# Patient Record
Sex: FEMALE | Race: WHITE | NOT HISPANIC OR LATINO | Employment: OTHER | URBAN - METROPOLITAN AREA
[De-identification: names, ages, dates, MRNs, and addresses within clinical notes are randomized per-mention and may not be internally consistent; named-entity substitution may affect disease eponyms.]

---

## 2017-01-26 ENCOUNTER — ALLSCRIPTS OFFICE VISIT (OUTPATIENT)
Dept: OTHER | Facility: OTHER | Age: 52
End: 2017-01-26

## 2017-01-26 DIAGNOSIS — Z12.31 ENCOUNTER FOR SCREENING MAMMOGRAM FOR MALIGNANT NEOPLASM OF BREAST: ICD-10-CM

## 2017-01-29 LAB — CULTURE RESULT (HISTORICAL): NEGATIVE

## 2017-01-30 ENCOUNTER — GENERIC CONVERSION - ENCOUNTER (OUTPATIENT)
Dept: OTHER | Facility: OTHER | Age: 52
End: 2017-01-30

## 2017-02-16 ENCOUNTER — GENERIC CONVERSION - ENCOUNTER (OUTPATIENT)
Dept: FAMILY MEDICINE CLINIC | Facility: CLINIC | Age: 52
End: 2017-02-16

## 2017-05-23 ENCOUNTER — ALLSCRIPTS OFFICE VISIT (OUTPATIENT)
Dept: OTHER | Facility: OTHER | Age: 52
End: 2017-05-23

## 2017-05-23 ENCOUNTER — HOSPITAL ENCOUNTER (OUTPATIENT)
Dept: RADIOLOGY | Facility: CLINIC | Age: 52
Discharge: HOME | End: 2017-05-23
Payer: COMMERCIAL

## 2017-05-23 DIAGNOSIS — M79.641 PAIN OF RIGHT HAND: ICD-10-CM

## 2017-05-23 PROCEDURE — 73130 X-RAY EXAM OF HAND: CPT

## 2017-05-24 ENCOUNTER — GENERIC CONVERSION - ENCOUNTER (OUTPATIENT)
Dept: FAMILY MEDICINE CLINIC | Facility: CLINIC | Age: 52
End: 2017-05-24

## 2017-06-09 ENCOUNTER — HOSPITAL ENCOUNTER (OUTPATIENT)
Facility: HOSPITAL | Age: 52
Setting detail: OUTPATIENT SURGERY
Discharge: HOME | End: 2017-06-09
Attending: ORTHOPAEDIC SURGERY | Admitting: ORTHOPAEDIC SURGERY
Payer: COMMERCIAL

## 2017-06-09 VITALS
TEMPERATURE: 98 F | BODY MASS INDEX: 21.9 KG/M2 | DIASTOLIC BLOOD PRESSURE: 60 MMHG | RESPIRATION RATE: 16 BRPM | SYSTOLIC BLOOD PRESSURE: 127 MMHG | HEART RATE: 81 BPM | OXYGEN SATURATION: 99 % | HEIGHT: 70 IN | WEIGHT: 153 LBS

## 2017-06-09 RX ORDER — OXYCODONE HYDROCHLORIDE AND ACETAMINOPHEN 5; 325 MG/1; MG/1
1 TABLET ORAL EVERY 6 HOURS PRN
Qty: 15 TABLET | Refills: 0 | Status: SHIPPED | OUTPATIENT
Start: 2017-06-09 | End: 2017-06-19

## 2017-06-09 RX ORDER — LIDOCAINE HYDROCHLORIDE AND EPINEPHRINE 10; 10 MG/ML; UG/ML
INJECTION, SOLUTION INFILTRATION; PERINEURAL AS NEEDED
Status: DISCONTINUED | OUTPATIENT
Start: 2017-06-09 | End: 2017-06-09 | Stop reason: HOSPADM

## 2017-06-09 RX ORDER — IBUPROFEN 600 MG/1
600 TABLET ORAL EVERY 6 HOURS PRN
Qty: 30 TABLET | Refills: 0 | Status: SHIPPED | OUTPATIENT
Start: 2017-06-09 | End: 2018-03-09

## 2017-06-27 ENCOUNTER — ALLSCRIPTS OFFICE VISIT (OUTPATIENT)
Dept: OTHER | Facility: OTHER | Age: 52
End: 2017-06-27

## 2017-08-03 ENCOUNTER — ALLSCRIPTS OFFICE VISIT (OUTPATIENT)
Dept: OTHER | Facility: OTHER | Age: 52
End: 2017-08-03

## 2017-10-10 ENCOUNTER — GENERIC CONVERSION - ENCOUNTER (OUTPATIENT)
Dept: OTHER | Facility: OTHER | Age: 52
End: 2017-10-10

## 2017-11-07 ENCOUNTER — ALLSCRIPTS OFFICE VISIT (OUTPATIENT)
Dept: OTHER | Facility: OTHER | Age: 52
End: 2017-11-07

## 2017-11-08 NOTE — PROGRESS NOTES
Assessment  1  Asthma (493 90) (J45 909)   2  Type 1 diabetes mellitus without complication (793 70) (U80 0)   3  Chronic obstructive pulmonary disease (496) (J44 9)   4  Rheumatoid arthritis (714 0) (M06 9)    Plan  Asthma    · Azithromycin 250 MG Oral Tablet; TAKE AS DIRECTED PER PACKAGE  INSTRUCTIONS   · Nebulizer Supplies; Status:Complete;   Done: 68VEO6532    Discussion/Summary    In summary this is a 63-year-old woman with history of asthma in addition to type 1 diabetes, rheumatoid arthritis among other who presents today with acute set the patient asthma with bronchitis  Will give her azithromycin Z-Fareed and we also gave her a sample of Dulera 200, 1 puff b i d  she will continue to use her nebulizer with albuterol as well as her Singulair  She will push fluids and rest  She is asked to call if her condition is not improving over the next 3-4 days  She will call sooner or seek more urgent medical attention should deteriorate  She agrees  Chief Complaint  The last couple of weeks my asthma and allergies acted up  I broke out in a sweat and felt feverish  Daughter with pneumonia as well as grandson admitted with pneumonia and asthma exacerbation  Using nebs daily  History of Present Illness  HPI: The patient is a 63-year-old asthmatic type 1 diabetic female who states that over the past several weeks for her asthma has been worse as well as her allergies  Recently her daughter was diagnosis with pneumonia as well as her grandson was admitted over night with an asthma exacerbation on top of pneumonia  She has been using albuterol nebulizers daily  Recently she has developed fevers feeling that she has had no documented fever  She has no chest pain  She has no dyspnea edema lightheadedness etc  Blood sugars have been relatively labile  She has had some upper respiratory symptoms though no definitive  All nasal discharge postnasal drip or sputum cetera  Asthma (Follow-Up):  The patient is being seen for an acute exacerbation of asthma  The patient's long-term asthma pattern has been classified as mild persistent   The patient states she has been doing poorly with her asthma control since the last visit  Interval Symptoms:   worsened wheezing,-- worsened coughing,-- denies shortness of breath-- and-- worsened chest tightness  Associated symptoms include awakened at night because of cough, but-- no chest pain or discomfort  More frequent rescue inhaler use  Interval Triggers: upper respiratory tract infection-- and-- pollen exposure  Medications: a short acting beta agonist agent   Diabetes Type I (Follow-Up) (Brief): The patient is being seen for follow up of a routine clinic visit for diabetes mellitus type I  Home glucose testing results include a recent high glucose of mg/dl  Review of Systems    Constitutional: chills-- and-- feeling tired  Cardiovascular: no chest pain,-- no palpitations-- and-- no lower extremity edema  Respiratory: cough-- and-- wheezing, but-- no shortness of breath  Neurological: no dizziness  Active Problems  1  Acute gouty arthritis (274 01) (M10 9)   2  Acute pharyngitis, unspecified etiology (462) (J02 9)   3  Aftercare following surgery (V58 89) (Z48 89)   4  Allergic rhinitis (477 9) (J30 9)   5  Analgesic use (V58 69) (Z79 899)   6  Asthma (493 90) (J45 909)   7  Carpal tunnel syndrome (354 0) (G56 00)   8  Carpal tunnel syndrome, unspecified laterality (354 0) (G56 00)   9  Cervical disc herniation (722 0) (M50 20)   10  Cervical radiculopathy (723 4) (M54 12)   11  Cervical spinal stenosis (723 0) (M48 02)   12  Cervicalgia (723 1) (M54 2)   13  Chronic obstructive pulmonary disease (496) (J44 9)   14  De Quervain's tenosynovitis (727 04) (M65 4)   15  Degenerative disc disease, cervical (722 4) (M50 30)   16  Encounter for screening mammogram for breast cancer (V76 12) (Z12 31)   17  Follow-up examination following surgery (V67 00) (Z09)   18   Lumbago With Sciatica (724 3)   19  Muscle pain, myofacial (729 1) (M79 1)   20  Other acute sinusitis (461 8) (J01 80)   21  Postoperative infection (998 59) (T81 4XXA)   22  Pre-ulcerative corn or callous (700) (L84)   23  Rheumatoid arthritis (714 0) (M06 9)   24  Right hand pain (729 5) (M79 641)   25  Spondylosis of cervical region without myelopathy or radiculopathy (721 0) (M47 812)   26  Tenosynovitis of finger (727 05) (M65 9)   27  Tinea pedis (110 4) (B35 3)   28  Trigger finger of right thumb (727 03) (M65 311)   29  Trigger middle finger of left hand (727 03) (M65 332)   30  Type 1 diabetes mellitus without complication (984 18) (A82 5)   31  Ulnar neuropathy (354 2) (G56 20)   32  Ulnar neuropathy (354 2) (G56 20)   33  Ulnar neuropathy at elbow of right upper extremity (354 2) (G56 21)   34  Ulnar neuropathy at elbow, left (354 2) (G56 22)    Past Medical History  1  History of Acute Bronchitis With Bronchospasm (466 0)   2  History of Arm pain (729 5) (M79 603)   3  History of Arm weakness (729 89) (R29 898)   4  History of Bilateral swelling of feet (729 81) (M79 89)   5  History of Cervical radiculopathy (723 4) (M54 12)   6  History of Cervical spinal stenosis (723 0) (M48 02)   7  History of Cervicalgia (723 1) (M54 2)   8  History of Chronic cough (786 2) (R05)   9  History of Chronic fatigue (780 79) (R53 82)   10  History of Dry skin (701 1) (L85 3)   11  Excessive thirst (783 5) (R63 1)   12  History of acute sinusitis (V12 69) (Z87 09)   13  History of arthritis (V13 4) (Z87 39)   14  History of arthritis (V13 4) (Z87 39)   15  History of breast lump (V13 89) (Z87 898)   16  History of bronchitis (V12 69) (Z87 09)   17  History of depression (V11 8) (Z86 59)   18  History of diabetes mellitus (V12 29) (Z86 39)   19  History of esophageal reflux (V12 79) (Z87 19)   20  History of fibromyalgia (V13 59) (Z87 39)   21  History of gastric ulcer (V12 79) (Z87 19)   22   History of heartburn (V12 79) (H38 361) 23  History of hypertension (V12 59) (Z86 79)   24  History of hypothyroidism (V12 29) (Z86 39)   25  History of intermittent claudication (V12 50) (Z86 79)   26  History of pulmonary emphysema (V12 69) (Z87 09)   27  History of seasonal allergies (V15 09) (Z88 9)   28  History of shortness of breath (V13 89) (Z87 898)   29  History of sinusitis (V12 69) (Z87 09)   30  History of sleep disturbance (V13 89) (Z87 898)   31  History of thyroid disease (V12 29) (Z86 39)   32  History of type 1 diabetes mellitus (V12 29) (Z86 39)   33  History of Indigestion (536 8) (K30)   34  History of Joint pain (719 40) (M25 50)   35  History of Joint swelling (719 00) (M25 40)   36  History of Leg pain (729 5) (M79 606)   37  History of Nasal congestion (478 19) (R09 81)   38  History of Nasal drainage (478 19) (J34 89)   39  History of Night sweat (780 8) (R61)   40  History of Sinus pain (478 19) (J34 89)   41  History of Wears eyeglasses (V49 89) (Z97 3)    Family History  Mother    1  Family history of arthritis (V17 7) (Z82 61)   2  Family history of High cholesterol   3  Family history of Irregular heartbeat  Father    4  Family history of arthritis (V17 7) (Z82 61)   5  Family history of hypertension (V17 49) (Z82 49)   6  Family history of High cholesterol   7  Family history of Neck problem  Sibling    8  Family history of High cholesterol  Maternal Grandmother    9  Family history of diabetes mellitus (V18 0) (Z83 3)   10  Family history of hypertension (V17 49) (Z82 49)   11  Family history of High cholesterol  Paternal Grandmother    15  Family history of blood clots (V18 3) (Z82 49)  Maternal Grandfather    13  Family history of Automobile accident  Paternal Grandfather    15  Family history of Automobile accident  Family History    13  Family history of Back problem   16  Family history of Cancer   17  Family history of arthritis (V17 7) (Z82 61)   18  Family history of cardiac disorder (V17 49) (Z82 49)   19   Family history of diabetes mellitus (V18 0) (Z83 3)   20  Family history of Hypertension, benign   21  Family history of Neuropathy, generalized    Social History   · Denied: History of Alcohol   · Current every day smoker (305 1) (F17 200)   · Denied: History of Currently sexually active   · Drinks coffee   · Former smoker (V15 82) (K62 786)   · High school or GED   ·    · No drug use   · Two children   · Unemployed (V62 0) (Z56 0)    Surgical History  1  History of Back Surgery   2  History of Hysterectomy   3  History of Neuroplasty Ulnar Nerve    Current Meds   1  Albuterol Sulfate (2 5 MG/3ML) 0 083% Inhalation Nebulization Solution; USE 1 UNIT   DOSE IN NEBULIZER 4 TIMES DAILY; Therapy: 27TDC7961 to (Evaluate:18Jun2017)  Requested for: 26TCK1610; Last   Rx:39Pdq7224 Ordered   2  Atorvastatin Calcium 40 MG Oral Tablet; TAKE 1 TABLET DAILY; Therapy: 41FHM6585 to Recorded   3  Gabapentin 300 MG Oral Capsule; TAKE 1 CAPSULE 3 TIMES DAILY; Therapy: 92PYN6003 to (Evaluate:23May2017)  Requested for: 03Gmd5562; Last   Rx:23Bpo0964 Ordered   4  Jet-Air Reusable Nebulizer Sys KIT; USE AS DIRECTED; Therapy: 82NOV6287 to (Last Rx:08Apr2016) Ordered   5  Lancets Miscellaneous; Therapy: 62AEU0728 to (Last Rx:01Nov2011)  Requested for: 73EAJ7332 Ordered   6  Lancing Device Miscellaneous; Therapy: 73NEW4136 to (Last Rx:04Oct2011)  Requested for: 57DDI3566 Ordered   7  Levothyroxine Sodium 75 MCG Oral Tablet; TAKE 1 TABLET DAILY; Therapy: 98CFB0834 to Recorded   8  Montelukast Sodium 10 MG Oral Tablet; Take 1 tablet daily; Therapy: 08Apr2016 to (Evaluate:13Jan2018)  Requested for: 69CCE5712; Last   Rx:81Tzl2754 Ordered   9  Nebulizer Device; USE AS DIRECTED; Therapy: 06LGH4720 to (Last Rx:20Oct2012) Ordered   10  NovoLOG SOLN; INJECT 150 UNIT  EVERY THREE DAYS; Therapy: (Recorded:09Cqw9902) to Recorded   11  OneTouch Ultra Blue In [employer];     Therapy: 87WPO5881 to (Last Rx:10Sep2011)  Requested for: 25PIN2082 Ordered   12  OneTouch Ultra Control In Vitro Solution; Therapy: 93IRP8644 to (Last Rx:04Oct2011)  Requested for: 61AZV3351 Ordered   13  ProAir  (90 Base) MCG/ACT Inhalation Aerosol Solution; INHALE 2 PUFFS    FOUR TIMES DAILY AS DIRECTED; Therapy: 00QXP1691 to (Evaluate:49Fwm4132)  Requested for: 08Apr2016; Last    Rx:41Rfv3474 Ordered    Allergies  1  CeleBREX CAPS   2  OxyCODONE HCl TABS  3  No Known Environmental Allergies   4  No Known Food Allergies    Vitals   Recorded: 23SIC3418 04:27PM   Temperature 76 6 F   Systolic 409   Diastolic 84   Height 5 ft 9 5 in   Weight 154 lb    BMI Calculated 22 42   BSA Calculated 1 86     Physical Exam    Constitutional   General appearance: Abnormal  -- Alert and oriented no apparent distress but mildly ill-appearing  Ears, Nose, Mouth, and Throat   Otoscopic examination: Tympanic membranes translucent with normal light reflex  Canals patent without erythema  Oropharynx: Normal with no erythema, edema, exudate or lesions  -- ECHO some moist without lesion  Pulmonary   Auscultation of lungs: Abnormal  -- Scattered rhonchi with moderate expiratory wheezing  No crackles appreciated  Cardiovascular   Auscultation of heart: Normal rate and rhythm, normal S1 and S2, without murmurs  -- With a rate in the 70s  Examination of extremities for edema and/or varicosities: Normal  -- No edema  Lymphatic   Palpation of lymph nodes in neck: No lymphadenopathy  -- No JVD  Musculoskeletal   Digits and nails: Normal without clubbing or cyanosis  -- No clubbing or cyanosis and normal capillary refill     Psychiatric   Orientation to person, place, and time: Normal     Mood and affect: Normal          Signatures   Electronically signed by : ISA Rose ; Nov 7 2017 10:34PM EST                       (Author)

## 2017-11-20 ENCOUNTER — GENERIC CONVERSION - ENCOUNTER (OUTPATIENT)
Dept: OTHER | Facility: OTHER | Age: 52
End: 2017-11-20

## 2017-11-20 ENCOUNTER — HOSPITAL ENCOUNTER (OUTPATIENT)
Dept: RADIOLOGY | Facility: HOSPITAL | Age: 52
Discharge: HOME | End: 2017-11-20
Payer: COMMERCIAL

## 2017-11-20 ENCOUNTER — TRANSCRIBE ORDERS (OUTPATIENT)
Dept: ADMINISTRATIVE | Facility: HOSPITAL | Age: 52
End: 2017-11-20

## 2017-11-20 DIAGNOSIS — J45.909 UNCOMPLICATED ASTHMA: ICD-10-CM

## 2017-11-20 DIAGNOSIS — J18.9 PNEUMONIA: ICD-10-CM

## 2017-11-20 PROCEDURE — 71020 HB CHEST X-RAY 2VW FRONTAL&LATL: CPT

## 2017-11-21 ENCOUNTER — GENERIC CONVERSION - ENCOUNTER (OUTPATIENT)
Dept: OTHER | Facility: OTHER | Age: 52
End: 2017-11-21

## 2017-12-14 ENCOUNTER — GENERIC CONVERSION - ENCOUNTER (OUTPATIENT)
Dept: FAMILY MEDICINE CLINIC | Facility: CLINIC | Age: 52
End: 2017-12-14

## 2018-01-09 ENCOUNTER — ALLSCRIPTS OFFICE VISIT (OUTPATIENT)
Dept: OTHER | Facility: OTHER | Age: 53
End: 2018-01-09

## 2018-01-10 NOTE — PROGRESS NOTES
Assessment   1  Other acute sinusitis (461 8) (J01 80)   2  Type 1 diabetes mellitus without complication (890 59) (Y21 8)   3  Chronic obstructive pulmonary disease (496) (J44 9)   4  Rheumatoid arthritis (714 0) (M06 9)    Plan   Other acute sinusitis    · Cefuroxime Axetil 500 MG Oral Tablet; TAKE 1 TABLET EVERY 12 HOURS DAILY   · Promethazine-DM 6 25-15 MG/5ML Oral Syrup; TAKE 5 - 10 ML BY MOUTH EVERY    6 HOURS AS NEEDED    Discussion/Summary      In summary then the patient is a insulin-dependent diabetic who presents with several days of respiratory symptomatology  By examination and history it appears that she has ethmoidal sinusitis  We are going to treat her with Ceftin 500 b i d  for 10 days with 1 refill  Also gave her some promethazine DM for symptom relief  She will push fluids and rest  She is going to monitor blood sugars  She will call endocrinologist should these become uncontrolled  She will follow up with us as needed or seek more urgent medical attention as needed  She agrees  Chief Complaint   I have green and yellow nasal d/c  Low grade fever  No CP but mild SOB  Mild facial and dental pressure  No N/V/D  L otalgia  Tobacco use  BS are running 180s since sick  History of Present Illness   HPI: The patient is a 24-year-old insulin-dependent diabetic who states she started with respiratory symptoms 1 week ago  She currently has yellow-green nasal discharge and facial pressure  She also complains of dental pressure  She has had no chest pain but she has had some mild shortness of breath and increased wheezing  She has had no nausea vomiting diarrhea  She has left otalgia  She continues to smoke  Her blood sugars are running in the 180 range  Review of Systems        Constitutional: fever,-- chills-- and-- feeling tired  ENT: nasal discharge  Cardiovascular: no chest pain-- and-- no lower extremity edema  Respiratory: PND        Gastrointestinal: no abdominal pain-- and-- no vomiting  Active Problems   1  Acute gouty arthritis (274 01) (M10 9)   2  Aftercare following surgery (V58 89) (Z48 89)   3  Allergic rhinitis (477 9) (J30 9)   4  Analgesic use (V58 69) (Z79 899)   5  Asthma (493 90) (J45 909)   6  Carpal tunnel syndrome (354 0) (G56 00)   7  Cervical disc herniation (722 0) (M50 20)   8  Cervical radiculopathy (723 4) (M54 12)   9  Cervical spinal stenosis (723 0) (M48 02)   10  Chronic obstructive pulmonary disease (496) (J44 9)   11  De Quervain's tenosynovitis (727 04) (M65 4)   12  Degenerative disc disease, cervical (722 4) (M50 30)   13  Lumbago With Sciatica (724 3)   14  Muscle pain, myofacial (729 1) (M79 1)   15  Other acute sinusitis (461 8) (J01 80)   16  History of Postoperative infection (998 59) (T81 4XXA)   17  Pre-ulcerative corn or callous (700) (L84)   18  Rheumatoid arthritis (714 0) (M06 9)   19  Spondylosis of cervical region without myelopathy or radiculopathy (721 0) (M47 812)   20  Trigger finger of right thumb (727 03) (M65 311)   21  Trigger middle finger of left hand (727 03) (M65 332)   22  Type 1 diabetes mellitus without complication (385 98) (P88 9)   23  Ulnar neuropathy at elbow of right upper extremity (354 2) (G56 21)   24  Ulnar neuropathy at elbow, left (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 2) (G56 22)    Past Medical History   1  History of Acute Bronchitis With Bronchospasm (466 0)   2  History of Arm pain (729 5) (M79 603)   3  History of Arm weakness (729 89) (R29 898)   4  History of Bilateral swelling of feet (729 81) (M79 89)   5  History of Cervical radiculopathy (723 4) (M54 12)   6  History of Cervical spinal stenosis (723 0) (M48 02)   7  History of Cervicalgia (723 1) (M54 2)   8  History of Chronic cough (786 2) (R05)   9  History of Chronic fatigue (780 79) (R53 82)   10  History of Dry skin (701 1) (L85 3)   11  Excessive thirst (783 5) (R63 1)   12  History of acute sinusitis (V12 69) (Z87 09)   13  History of arthritis (V13 4) (Z87 39)   14  History of arthritis (V13 4) (Z87 39)   15  History of breast lump (V13 89) (Z87 898)   16  History of bronchitis (V12 69) (Z87 09)   17  History of depression (V11 8) (Z86 59)   18  History of diabetes mellitus (V12 29) (Z86 39)   19  History of esophageal reflux (V12 79) (Z87 19)   20  History of fibromyalgia (V13 59) (Z87 39)   21  History of gastric ulcer (V12 79) (Z87 19)   22  History of heartburn (V12 79) (Z87 898)   23  History of hypertension (V12 59) (Z86 79)   24  History of hypothyroidism (V12 29) (Z86 39)   25  History of intermittent claudication (V12 50) (Z86 79)   26  History of pulmonary emphysema (V12 69) (Z87 09)   27  History of seasonal allergies (V15 09) (Z88 9)   28  History of shortness of breath (V13 89) (Z87 898)   29  History of sinusitis (V12 69) (Z87 09)   30  History of sleep disturbance (V13 89) (Z87 898)   31  History of thyroid disease (V12 29) (Z86 39)   32  History of type 1 diabetes mellitus (V12 29) (Z86 39)   33  History of Indigestion (536 8) (K30)   34  History of Joint pain (719 40) (M25 50)   35  History of Joint swelling (719 00) (M25 40)   36  History of Leg pain (729 5) (M79 606)   37  History of Nasal congestion (478 19) (R09 81)   38  History of Nasal drainage (478 19) (J34 89)   39  History of Night sweat (780 8) (R61)   40  History of Postoperative infection (998 59) (T81 4XXA)   41  History of Sinus pain (478 19) (J34 89)   42  History of Wears eyeglasses (V49 89) (Z97 3)    Family History   Mother    1  Family history of arthritis (V17 7) (Z82 61)   2  Family history of High cholesterol   3  Family history of Irregular heartbeat  Father    4  Family history of arthritis (V17 7) (Z82 61)   5  Family history of hypertension (V17 49) (Z82 49)   6  Family history of High cholesterol   7  Family history of Neck problem  Sibling    8  Family history of High cholesterol  Maternal Grandmother    9  Family history of diabetes mellitus (V18 0) (Z83 3)   10   Family history of hypertension (V17 49) (Z82 49)   11  Family history of High cholesterol  Paternal Grandmother    15  Family history of blood clots (V18 3) (Z82 49)  Maternal Grandfather    13  Family history of Automobile accident  Paternal Grandfather    15  Family history of Automobile accident  Family History    13  Family history of Back problem   16  Family history of Cancer   17  Family history of arthritis (V17 7) (Z82 61)   18  Family history of cardiac disorder (V17 49) (Z82 49)   19  Family history of diabetes mellitus (V18 0) (Z83 3)   20  Family history of Hypertension, benign   21  Family history of Neuropathy, generalized    Social History    · Denied: History of Alcohol   · Current every day smoker (305 1) (F17 200)   · Denied: History of Currently sexually active   · Drinks coffee   · Former smoker (V15 82) (J47 964)   · High school or GED   ·    · No drug use   · Two children   · Unemployed (V62 0) (Z56 0)  The social history was reviewed and updated today  The social history was reviewed and is unchanged  Surgical History   1  History of Back Surgery   2  History of Hysterectomy   3  History of Neuroplasty Ulnar Nerve    Current Meds    1  Albuterol Sulfate (2 5 MG/3ML) 0 083% Inhalation Nebulization Solution; USE 1 UNIT     DOSE IN NEBULIZER 4 TIMES DAILY; Therapy: 83EEN0065 to (Evaluate:18Jun2017)  Requested for: 82WQK1349; Last     Rx:66Ywe8746 Ordered   2  Atorvastatin Calcium 40 MG Oral Tablet; TAKE 1 TABLET DAILY; Therapy: 78BIR0394 to Recorded   3  Gabapentin 300 MG Oral Capsule; TAKE ONE CAPSULE BY MOUTH 3 TIMES A DAY; Therapy: 16KDF2623 to (Evaluate:31Mar2018)  Requested for: 62Zwi6833; Last     Rx:52Fue5378 Ordered   4  Gabapentin 600 MG Oral Tablet; TAKE 1 TABLET 3 times daily; Therapy: 61LKD7105 to (Evaluate:29Mar2018)  Requested for: 00Npl4370; Last     Rx:41Qeh3387 Ordered   5  Jet-Air Reusable Nebulizer Sys KIT; USE AS DIRECTED;      Therapy: 21UNR8215 to (Last Rx:08Apr2016) Ordered   6  Lancing Device Miscellaneous; Therapy: 01KLH2972 to (Last Rx:04Oct2011)  Requested for: 07AEI5959 Ordered   7  Levothyroxine Sodium 75 MCG Oral Tablet; TAKE 1 TABLET DAILY; Therapy: 34VQR5067 to Recorded   8  Montelukast Sodium 10 MG Oral Tablet; Take 1 tablet daily; Therapy: 35Ini5481 to (Evaluate:13Jan2018)  Requested for: 66KYV7867; Last     Rx:51Aaj7813 Ordered   9  Nebulizer Device; USE AS DIRECTED; Therapy: 67OEC2306 to (Last Rx:20Oct2012) Ordered   10  NovoLOG SOLN; INJECT 150 UNIT  EVERY THREE DAYS; Therapy: (Recorded:26Iyb8771) to Recorded   11  OneTouch Ultra Blue In [employer]; Therapy: 00NVQ2984 to (Last Rx:11Tdv0183)  Requested for: 05Xdj8268 Ordered   12  OneTouch Ultra Control In Vitro Solution; Therapy: 26NRO3439 to (Last Rx:04Oct2011)  Requested for: 81ZQK7722 Ordered   13  ProAir  (90 Base) MCG/ACT Inhalation Aerosol Solution; INHALE 2 PUFFS      FOUR TIMES DAILY AS DIRECTED; Therapy: 42SIE6460 to (Last Rx:20Nov2017)  Requested for: 20Nov2017 Ordered    Allergies   1  CeleBREX CAPS   2  OxyCODONE HCl TABS  3  No Known Environmental Allergies   4  No Known Food Allergies    Vitals    Recorded: 97RPU4811 04:06PM   Temperature 15 7 F   Systolic 656, LUE, Sitting   Diastolic 64, LUE, Sitting   Weight 156 lb    BMI Calculated 22 71   BSA Calculated 1 87     Physical Exam        Constitutional      General appearance: Abnormal   well developed,-- overweight-- and-- appearance reflects stated age  Eyes      Conjunctiva and lids: No swelling, erythema or discharge  Ears, Nose, Mouth, and Throat      External inspection of ears and nose: Abnormal  -- Tender ethmoidal       Otoscopic examination: Abnormal  -- BATOOL AS  Nasal mucosa, septum, and turbinates: Abnormal  -- Red and boggy  Oropharynx: Normal with no erythema, edema, exudate or lesions  -- No ulcer or exudate        Pulmonary      Respiratory effort: Abnormal  -- Occasional dry cough  Auscultation of lungs: Abnormal  -- Mild expiratory delay without crackle or rhonchi  Cardiovascular      Auscultation of heart: Normal rate and rhythm, normal S1 and S2, without murmurs  The heart rate was normal  The rhythm was regular  Heart sounds: normal S1,-- normal S2-- and-- no gallop heard  no murmurs were heard  Examination of extremities for edema and/or varicosities: Normal        Musculoskeletal      Digits and nails: Normal without clubbing or cyanosis  -- - CCE        Psychiatric      Orientation to person, place, and time: Normal        Mood and affect: Normal           Signatures    Electronically signed by : ISA Gibson ; Jan 9 2018  4:30PM EST                       (Author)

## 2018-01-11 NOTE — RESULT NOTES
Verified Results  ([facility]) Beta Strep Gp A Culture [id number] 12:00AM Sj Faustin     Test Name Result Flag Reference   Beta Strep Gp A Culture Negative         Discussion/Summary   Please let Citlali Tilley know that her throat culture was negative

## 2018-01-12 VITALS
BODY MASS INDEX: 22.19 KG/M2 | DIASTOLIC BLOOD PRESSURE: 70 MMHG | HEART RATE: 82 BPM | WEIGHT: 155 LBS | HEIGHT: 70 IN | SYSTOLIC BLOOD PRESSURE: 118 MMHG

## 2018-01-13 VITALS
DIASTOLIC BLOOD PRESSURE: 75 MMHG | WEIGHT: 158 LBS | BODY MASS INDEX: 22.62 KG/M2 | HEART RATE: 85 BPM | HEIGHT: 70 IN | SYSTOLIC BLOOD PRESSURE: 130 MMHG

## 2018-01-13 VITALS
SYSTOLIC BLOOD PRESSURE: 160 MMHG | WEIGHT: 154 LBS | TEMPERATURE: 98.2 F | BODY MASS INDEX: 22.05 KG/M2 | HEIGHT: 70 IN | DIASTOLIC BLOOD PRESSURE: 84 MMHG

## 2018-01-14 VITALS
DIASTOLIC BLOOD PRESSURE: 59 MMHG | HEIGHT: 70 IN | SYSTOLIC BLOOD PRESSURE: 121 MMHG | BODY MASS INDEX: 22.33 KG/M2 | WEIGHT: 156 LBS | TEMPERATURE: 97.9 F

## 2018-01-14 VITALS
HEIGHT: 70 IN | BODY MASS INDEX: 22.19 KG/M2 | HEART RATE: 80 BPM | DIASTOLIC BLOOD PRESSURE: 75 MMHG | WEIGHT: 155 LBS | SYSTOLIC BLOOD PRESSURE: 135 MMHG

## 2018-01-17 NOTE — RESULT NOTES
Verified Results  * XR CHEST PA & LATERAL 63UMH3888 05:57PM Richard Reed Order Number: [id number]     Test Name Result Flag Reference   XR CHEST PA & LATERAL (Report)     CHEST - DUAL ENERGY     INDICATION: Productive cough  Congestion, shortness of breath     COMPARISON: 8/20/2015     VIEWS: PA (including soft tissue/bone algorithms) and lateral projections     IMAGES: 4     FINDINGS:        Cardiomediastinal silhouette appears unremarkable  Lungs are hyperinflated suggesting COPD  No acute infiltrate  Stable biapical pleural-parenchymal scarring  No pneumothorax or pleural effusion  Visualized osseous structures appear within normal limits for the patient's age  IMPRESSION:     Findings suggest COPD   No acute infiltrate       Workstation performed: [id number]     Signed by:   Frankie Hanna MD   11/21/17

## 2018-01-22 VITALS
WEIGHT: 151 LBS | TEMPERATURE: 99.4 F | SYSTOLIC BLOOD PRESSURE: 120 MMHG | DIASTOLIC BLOOD PRESSURE: 60 MMHG | BODY MASS INDEX: 21.98 KG/M2

## 2018-01-23 VITALS
BODY MASS INDEX: 22.71 KG/M2 | DIASTOLIC BLOOD PRESSURE: 64 MMHG | SYSTOLIC BLOOD PRESSURE: 140 MMHG | TEMPERATURE: 99.1 F | WEIGHT: 156 LBS

## 2018-01-26 DIAGNOSIS — J45.909 PERSISTENT ASTHMA WITHOUT COMPLICATION, UNSPECIFIED ASTHMA SEVERITY: Primary | ICD-10-CM

## 2018-01-26 RX ORDER — MONTELUKAST SODIUM 10 MG/1
TABLET ORAL
Qty: 30 TABLET | Refills: 5 | Status: SHIPPED | OUTPATIENT
Start: 2018-01-26 | End: 2018-01-29 | Stop reason: SDUPTHER

## 2018-01-29 DIAGNOSIS — J45.909 PERSISTENT ASTHMA WITHOUT COMPLICATION, UNSPECIFIED ASTHMA SEVERITY: ICD-10-CM

## 2018-01-29 RX ORDER — MONTELUKAST SODIUM 10 MG/1
TABLET ORAL
Qty: 30 TABLET | Refills: 5 | Status: SHIPPED | OUTPATIENT
Start: 2018-01-29 | End: 2018-12-13 | Stop reason: SDUPTHER

## 2018-02-09 NOTE — RESULT NOTES
Verified Results  *VB-Foot Exam [id number] 12:00AM Jason Vidal     Test Name Result Flag Reference   FOOT EXAM [id number]
No

## 2018-03-09 ENCOUNTER — OFFICE VISIT (OUTPATIENT)
Dept: FAMILY MEDICINE CLINIC | Facility: CLINIC | Age: 53
End: 2018-03-09
Payer: COMMERCIAL

## 2018-03-09 VITALS
WEIGHT: 155 LBS | SYSTOLIC BLOOD PRESSURE: 120 MMHG | TEMPERATURE: 98.2 F | BODY MASS INDEX: 22.56 KG/M2 | DIASTOLIC BLOOD PRESSURE: 62 MMHG

## 2018-03-09 DIAGNOSIS — L02.219 ABSCESS OF PUBIC REGION: Primary | ICD-10-CM

## 2018-03-09 PROBLEM — M65.311 TRIGGER FINGER OF RIGHT THUMB: Status: ACTIVE | Noted: 2017-05-23

## 2018-03-09 PROCEDURE — 10060 I&D ABSCESS SIMPLE/SINGLE: CPT | Performed by: FAMILY MEDICINE

## 2018-03-09 PROCEDURE — 99213 OFFICE O/P EST LOW 20 MIN: CPT | Performed by: FAMILY MEDICINE

## 2018-03-09 RX ORDER — GABAPENTIN 600 MG/1
600 TABLET ORAL 3 TIMES DAILY
Refills: 2 | COMMUNITY
Start: 2018-02-08 | End: 2018-05-24 | Stop reason: SDUPTHER

## 2018-03-09 RX ORDER — DOXYCYCLINE HYCLATE 100 MG/1
100 CAPSULE ORAL EVERY 12 HOURS SCHEDULED
Qty: 20 CAPSULE | Refills: 0 | OUTPATIENT
Start: 2018-03-09 | End: 2018-03-19

## 2018-03-09 RX ORDER — LANCING DEVICE
EACH MISCELLANEOUS
COMMUNITY
Start: 2011-10-04 | End: 2020-07-06 | Stop reason: ALTCHOICE

## 2018-03-09 RX ORDER — LEVOTHYROXINE SODIUM 112 UG/1
112 TABLET ORAL DAILY
Refills: 0 | COMMUNITY
Start: 2018-02-17

## 2018-03-09 RX ORDER — ALBUTEROL SULFATE 2.5 MG/3ML
1 SOLUTION RESPIRATORY (INHALATION) 4 TIMES DAILY
COMMUNITY
Start: 2012-02-03 | End: 2018-12-06 | Stop reason: SDUPTHER

## 2018-03-09 RX ORDER — BLOOD-GLUCOSE CONTROL, NORMAL
EACH MISCELLANEOUS
COMMUNITY
Start: 2011-10-04

## 2018-03-09 NOTE — ASSESSMENT & PLAN NOTE
Patient has a abscess/carbuncle in her left upper supra pubic/para labial region  This was incised with a 11  Blade after sterile prep and purulent material was expressed for culture  Will await results of the culture and be in contact with patient when it is available  In the meantime will start her on doxycycline 100 mg b i d  for the potential for MRSA  She is asked to use warm compresses were soak in a warm soapy bath tub in the meantime  She will call if she develops fevers chills constitutional symptoms or seek more urgent medical attention  She all to watch her blood sugars as well due to this infection

## 2018-03-09 NOTE — PROGRESS NOTES
Assessment/Plan:  Abscess of pubic region  Patient has a abscess/carbuncle in her left upper supra pubic/para labial region  This was incised with a 11  Blade after sterile prep and purulent material was expressed for culture  Will await results of the culture and be in contact with patient when it is available  In the meantime will start her on doxycycline 100 mg b i d  for the potential for MRSA  She is asked to use warm compresses were soak in a warm soapy bath tub in the meantime  She will call if she develops fevers chills constitutional symptoms or seek more urgent medical attention  She all to watch her blood sugars as well due to this infection  Type 1 diabetes mellitus without complication (HCC)  Monitor blood sugars due to infection  Call or seek more urgent medical attention as needed  Diagnoses and all orders for this visit:    Abscess of pubic region  -     Culture, Aerobic Bacteria  -     doxycycline hyclate (VIBRAMYCIN) 100 mg capsule; Take 1 capsule (100 mg total) by mouth every 12 (twelve) hours for 10 days    Other orders  -     albuterol (2 5 mg/3 mL) 0 083 % nebulizer solution; Inhale 1 each 4 (four) times a day  -     PROAIR  (90 Base) MCG/ACT inhaler; Inhale 2 puffs 4 (four) times a day As directed  -     gabapentin (NEURONTIN) 600 MG tablet; 600 mg 3 (three) times a day  -     Respiratory Therapy Supplies (JET-AIR REUSABLE NEBULIZER SYS) KIT; by Does not apply route  -     Lancet Devices (LANCING DEVICE) MISC; by Does not apply route  -     levothyroxine 112 mcg tablet; Take 112 mcg by mouth daily  -     Nebulizers MISC; by Does not apply route  -     insulin aspart (NOVOLOG) 100 units/mL injection;  Inject under the skin  -     Discontinue: Glucose Blood (ONETOUCH ULTRA BLUE VI); by In Vitro route  -     Blood Glucose Calibration (OT ULTRA/FASTTK CNTRL SOLN) SOLN; by In Vitro route  -     ONE TOUCH ULTRA TEST test strip;           Subjective:   Chief Complaint Patient presents with    Recurrent Skin Infections     groin area xs a week ago     I noticed a boil in my groin area  Felt like I was bit by a spider  Drained 2 days later  Tried to squeeze and got worse  Yellow purulence then heme  No cyst previous  No fever, chills, etc  BSs are not elevated  History of same  Patient ID: Siddharth Mendoza is a 46 y o  female  HPI  Patient is a 63-year-old insulin-dependent diabetic female who states she noted a boil in her left pubic region approximately 9 days ago  She tried to squeeze and drainage 2 days later and got some whitish yellow fluid from a but after that seem to enlarge relatively rapidly  At times she gets some minimal heme tinged yellow fluid from it  It is tender red and very uncomfortable  She has had no fevers chills or constitutional symptoms  Her blood sugars have not been uncontrolled  She has had some history of folliculitis or cysts in the area but she has never sought medical attention for this in the past   The seemed to resolve on her own  Her son's family was recently noted to have MRSA  The following portions of the patient's history were reviewed and updated as appropriate: allergies, current medications, past family history, past medical history, past social history and problem list     Review of Systems   Constitution: Negative for chills, fever and malaise/fatigue  Cardiovascular: Negative for chest pain  Respiratory: Negative for shortness of breath  Endocrine: Negative for polydipsia and polyphagia  Hematologic/Lymphatic: Negative for adenopathy  Skin:        As noted in HPI she has swollen red indurated tender lesion in her left suprapubic region  Gastrointestinal: Negative for abdominal pain  Genitourinary: Negative for pelvic pain  Neurological: Negative for headaches and light-headedness  Objective:    Physical Exam   Constitutional: She is oriented to person, place, and time   She appears well-developed and well-nourished  Abdominal: Soft  Bowel sounds are normal    No inguinal adenopathy   Neurological: She is alert and oriented to person, place, and time  Skin: There is erythema  Her left superior janene labial region/lower inguinal region has a 3 cm tender indurated erythematous subcutaneous mass  There is superficial over approximately 2/3 of the dome of the lesion  There is a small area inferiorly which looks to have possibly previously drained  The area is prepped with Betadine  11   Blade is used to expand the area that appeared to have drained  Some whitish yellow purulence is expressed and culture obtained  Further drainage was not possible due to discomfort when expression was attempted  4 x 4 was applied for bandage

## 2018-03-13 DIAGNOSIS — A49.02 MRSA INFECTION: Primary | ICD-10-CM

## 2018-03-13 LAB
BACTERIA SPEC AEROBE CULT: ABNORMAL
Lab: ABNORMAL
SL AMB ANTIMICROBIAL SUSCEPTIBILITY: ABNORMAL

## 2018-03-13 NOTE — ASSESSMENT & PLAN NOTE
The patient's carbuncle cultured MRSA  Susceptible to doxycycline  She is going to finish same  We are going to add Bactroban to treat her nasal cavity  She is improving  She will call back with report in 1 week

## 2018-03-16 LAB
CREAT ?TM UR-SCNC: 0.66 UMOL/L
MICROALBUMIN UR-MCNC: 6.9 MG/L (ref 0–20)
MICROALBUMIN/CREAT UR: 6.9 MG/G{CREAT}

## 2018-03-16 PROCEDURE — 3061F NEG MICROALBUMINURIA REV: CPT | Performed by: FAMILY MEDICINE

## 2018-05-24 DIAGNOSIS — M54.2 CERVICALGIA: Primary | ICD-10-CM

## 2018-05-24 RX ORDER — GABAPENTIN 600 MG/1
TABLET ORAL
Qty: 90 TABLET | Refills: 0 | Status: SHIPPED | OUTPATIENT
Start: 2018-05-24 | End: 2018-06-26 | Stop reason: SDUPTHER

## 2018-06-26 DIAGNOSIS — M54.2 CERVICALGIA: ICD-10-CM

## 2018-06-26 RX ORDER — GABAPENTIN 600 MG/1
TABLET ORAL
Qty: 90 TABLET | Refills: 0 | Status: SHIPPED | OUTPATIENT
Start: 2018-06-26 | End: 2018-08-10 | Stop reason: SDUPTHER

## 2018-07-03 ENCOUNTER — OFFICE VISIT (OUTPATIENT)
Dept: URGENT CARE | Facility: CLINIC | Age: 53
End: 2018-07-03
Payer: COMMERCIAL

## 2018-07-03 VITALS
BODY MASS INDEX: 22.05 KG/M2 | OXYGEN SATURATION: 97 % | HEIGHT: 70 IN | TEMPERATURE: 98.2 F | WEIGHT: 154 LBS | DIASTOLIC BLOOD PRESSURE: 80 MMHG | HEART RATE: 92 BPM | SYSTOLIC BLOOD PRESSURE: 148 MMHG | RESPIRATION RATE: 18 BRPM

## 2018-07-03 DIAGNOSIS — J01.10 ACUTE FRONTAL SINUSITIS, RECURRENCE NOT SPECIFIED: Primary | ICD-10-CM

## 2018-07-03 PROCEDURE — 99214 OFFICE O/P EST MOD 30 MIN: CPT | Performed by: PHYSICIAN ASSISTANT

## 2018-07-03 RX ORDER — DEXTROMETHORPHAN HYDROBROMIDE AND PROMETHAZINE HYDROCHLORIDE 15; 6.25 MG/5ML; MG/5ML
5 SYRUP ORAL 4 TIMES DAILY PRN
Qty: 118 ML | Refills: 0 | Status: SHIPPED | OUTPATIENT
Start: 2018-07-03 | End: 2018-08-14 | Stop reason: ALTCHOICE

## 2018-07-03 RX ORDER — AMOXICILLIN AND CLAVULANATE POTASSIUM 875; 125 MG/1; MG/1
1 TABLET, FILM COATED ORAL EVERY 12 HOURS SCHEDULED
Qty: 14 TABLET | Refills: 0 | Status: SHIPPED | OUTPATIENT
Start: 2018-07-03 | End: 2018-07-10

## 2018-07-03 NOTE — PROGRESS NOTES
[facility]        NAME: Bhavik Wyatt is a 48 y o  female  : 1965    MRN: [id number]  DATE: July 3, 2018  TIME: 2:04 PM    Assessment and Plan   Acute frontal sinusitis, recurrence not specified [J01 10]  1  Acute frontal sinusitis, recurrence not specified  amoxicillin-clavulanate (AUGMENTIN) 875-125 mg per tablet    promethazine-dextromethorphan (PHENERGAN-DM) 6 25-15 mg/5 mL oral syrup         Patient Instructions       Follow up with PCP in 3-5 days  Proceed to  ER if symptoms worsen  Chief Complaint     Chief Complaint   Patient presents with    sinus pressure     x 1 month    Nasal Congestion     x 1 week +, took zyrtec    Sore Throat    Fever     on and off          History of Present Illness         24-year-old female with a longstanding history of sinus problems and seasonal allergies complains of 3-4 weeks of sinus pain and pressure in her forehead, productive cough  She uses a nebulizer and albuterol inhaler at home as needed  She is taking Zyrtec over-the-counter and using nasal saline  No fever at home  No chest pain or shortness of breath  Smoker  Review of Systems   Review of Systems      Current Medications       Current Outpatient Prescriptions:     albuterol (2 5 mg/3 mL) 0 083 % nebulizer solution, Inhale 1 each 4 (four) times a day, Disp: , Rfl:     atorvastatin (LIPITOR) 40 mg tablet, Take 80 mg by mouth daily  , Disp: , Rfl:     Blood Glucose Calibration (OT ULTRA/FASTTK CNTRL SOLN) SOLN, by In Vitro route, Disp: , Rfl:     cetirizine (ZyrTEC) 10 mg tablet, Take 10 mg by mouth daily  , Disp: , Rfl:     gabapentin (NEURONTIN) 600 MG tablet, TAKE 1 TABLET 3 TIMES A DAY   Lou Zamora PER MD PT NEEDS APPOINTMENT FOR ADDITIONAL REFILLS, Disp: 90 tablet, Rfl: 0    insulin aspart (NOVOLOG) 100 units/mL injection, Inject under the skin, Disp: , Rfl:     insulin lispro (HumaLOG) 100 units/mL injection, Inject under the skin 3 (three) times a day before meals Indications: pump , Disp: , Rfl:     Lancet Devices (LANCING DEVICE) MISC, by Does not apply route, Disp: , Rfl:     levothyroxine 112 mcg tablet, Take 112 mcg by mouth daily, Disp: , Rfl: 0    montelukast (SINGULAIR) 10 mg tablet, TAKE 1 TABLET DAILY, Disp: 30 tablet, Rfl: 5    mupirocin (BACTROBAN) 2 % ointment, Apply topically 3 (three) times a day Treat nasal cavity 3 times a day for 10-14 days with Q-tip , Disp: 22 g, Rfl: 0    Nebulizers MISC, by Does not apply route, Disp: , Rfl:     ONE TOUCH ULTRA TEST test strip, , Disp: , Rfl:     PROAIR  (90 Base) MCG/ACT inhaler, Inhale 2 puffs 4 (four) times a day As directed, Disp: , Rfl: 5    Respiratory Therapy Supplies (JET-AIR REUSABLE NEBULIZER SYS) KIT, by Does not apply route, Disp: , Rfl:     amoxicillin-clavulanate (AUGMENTIN) 875-125 mg per tablet, Take 1 tablet by mouth every 12 (twelve) hours for 7 days, Disp: 14 tablet, Rfl: 0    promethazine-dextromethorphan (PHENERGAN-DM) 6 25-15 mg/5 mL oral syrup, Take 5 mL by mouth 4 (four) times a day as needed for cough, Disp: 118 mL, Rfl: 0    Current Allergies     Allergies as of 07/03/2018 - Reviewed 07/03/2018   Allergen Reaction Noted    Celecoxib GI Bleeding 09/06/2012    Codeine GI Intolerance 08/15/2016    Motrin [ibuprofen] GI Intolerance 08/15/2016    Oxycodone  03/17/2016    Vicodin [hydrocodone-acetaminophen] GI Intolerance 08/15/2016            The following portions of the patient's history were reviewed and updated as appropriate: allergies, current medications, past family history, past medical history, past social history, past surgical history and problem list      Past Medical History:   Diagnosis Date    Asthma     Diabetes mellitus ([address] )     Disease of thyroid gland     Fibromyalgia syndrome     High cholesterol     Neuropathic pain of both legs     PONV (postoperative nausea and vomiting)        Past Surgical History:   Procedure Laterality Date    BACK SURGERY l5 - s1 fused    CARPAL TUNNEL RELEASE Left     HYSTERECTOMY      NE INCISE FINGER TENDON SHEATH Left 8/16/2016    Procedure: LONG TRIGGER FINGER RELEASE ;  Surgeon: Andrew Davila MD;  [facility];  Service: Orthopedics    NE INCISE FINGER TENDON SHEATH Right 6/9/2017    Procedure: THUMB TRIGGER RELEASE;  Surgeon: Andrew Davila MD;  [facility];  Service: Orthopedics    NE WRIST [employer] LIG Right 10/14/2016    Procedure: ENDOSCOPIC CARPAL TUNNEL RELEASE ;  Surgeon: Andrew Davila MD;  [facility];  Service: Orthopedics    NE WRIST [employer] LIG Left 8/16/2016    Procedure: ENDOSCOPIC CARPAL TUNNEL RELEASE ;  Surgeon: Andrew Davila MD;  [facility];  Service: Orthopedics    ULNAR NERVE TRANSPOSITION Right        Family History   Problem Relation Age of Onset    Hyperlipidemia Mother     Hypertension Mother     Heart disease Mother     Hyperlipidemia Father     Hypertension Father          Medications have been verified  Objective   /80 (BP Location: Left arm, Patient Position: Sitting, Cuff Size: Standard)   Pulse 92   Temp 98 2 °F (36 8 °C) (Tympanic)   Resp 18   Ht 5' 9 5" (1 765 m)   Wt 69 9 kg (154 lb)   SpO2 97%   BMI 22 42 kg/m²        Physical Exam     Physical Exam   Constitutional: She appears well-developed and well-nourished  No distress  HENT:   Right Ear: Tympanic membrane, external ear and ear canal normal    Left Ear: Tympanic membrane, external ear and ear canal normal    Nose: Mucosal edema and rhinorrhea present  Right sinus exhibits frontal sinus tenderness  Right sinus exhibits no maxillary sinus tenderness  Left sinus exhibits frontal sinus tenderness  Left sinus exhibits no maxillary sinus tenderness  Mouth/Throat: Oropharynx is clear and moist  No posterior oropharyngeal erythema  Eyes: Conjunctivae and EOM are normal  Pupils are equal, round, and reactive to light   No scleral icterus  Neck: Normal range of motion  Neck supple  Cardiovascular: Normal rate, regular rhythm and normal heart sounds  Pulmonary/Chest: Effort normal  No accessory muscle usage  No respiratory distress  She has no decreased breath sounds  She has wheezes  She has rhonchi  She has no rales  Abdominal: Soft  Bowel sounds are normal  She exhibits no distension and no mass  There is no tenderness  There is no rebound and no guarding  Lymphadenopathy:     She has no cervical adenopathy  Skin: Skin is warm and dry  No rash noted

## 2018-07-03 NOTE — PATIENT INSTRUCTIONS
Patient Instructions     Continue nebs and allergy medication  Follow up with PCP in 3-5 days  Proceed to  ER if symptoms worsen

## 2018-07-30 DIAGNOSIS — M54.2 CERVICALGIA: ICD-10-CM

## 2018-07-30 RX ORDER — GABAPENTIN 600 MG/1
TABLET ORAL
Qty: 90 TABLET | Refills: 0 | OUTPATIENT
Start: 2018-07-30

## 2018-08-04 LAB — HBA1C MFR BLD HPLC: 7.6 %

## 2018-08-10 DIAGNOSIS — M54.2 CERVICALGIA: ICD-10-CM

## 2018-08-10 RX ORDER — GABAPENTIN 600 MG/1
600 TABLET ORAL 3 TIMES DAILY
Qty: 90 TABLET | Refills: 0 | Status: SHIPPED | OUTPATIENT
Start: 2018-08-10 | End: 2018-09-13 | Stop reason: SDUPTHER

## 2018-08-14 ENCOUNTER — OFFICE VISIT (OUTPATIENT)
Dept: FAMILY MEDICINE CLINIC | Facility: CLINIC | Age: 53
End: 2018-08-14
Payer: COMMERCIAL

## 2018-08-14 VITALS
HEART RATE: 95 BPM | SYSTOLIC BLOOD PRESSURE: 118 MMHG | HEIGHT: 70 IN | WEIGHT: 149 LBS | OXYGEN SATURATION: 97 % | TEMPERATURE: 97.9 F | DIASTOLIC BLOOD PRESSURE: 60 MMHG | BODY MASS INDEX: 21.33 KG/M2

## 2018-08-14 DIAGNOSIS — E10.9 TYPE 1 DIABETES MELLITUS WITHOUT COMPLICATION (HCC): ICD-10-CM

## 2018-08-14 DIAGNOSIS — M54.16 LUMBAR RADICULITIS: Primary | ICD-10-CM

## 2018-08-14 PROCEDURE — 99214 OFFICE O/P EST MOD 30 MIN: CPT | Performed by: FAMILY MEDICINE

## 2018-08-14 PROCEDURE — 3008F BODY MASS INDEX DOCD: CPT | Performed by: FAMILY MEDICINE

## 2018-08-14 RX ORDER — ATORVASTATIN CALCIUM 80 MG/1
80 TABLET, FILM COATED ORAL DAILY
Refills: 3 | COMMUNITY
Start: 2018-08-08

## 2018-08-14 RX ORDER — HYDROCODONE BITARTRATE AND ACETAMINOPHEN 5; 325 MG/1; MG/1
1 TABLET ORAL EVERY 6 HOURS PRN
Qty: 30 TABLET | Refills: 0 | Status: SHIPPED | OUTPATIENT
Start: 2018-08-14 | End: 2018-12-13 | Stop reason: ALTCHOICE

## 2018-08-14 RX ORDER — PREDNISONE 10 MG/1
10 TABLET ORAL DAILY
Qty: 22 TABLET | Refills: 0 | Status: SHIPPED | OUTPATIENT
Start: 2018-08-14 | End: 2018-12-08

## 2018-08-14 NOTE — ASSESSMENT & PLAN NOTE
No results found for: HGBA1C    No results for input(s): POCGLU in the last 72 hours  Blood Sugar Average: Last 72 hrs:      She is going to need to be observant of the effect of the prednisone on her blood sugars

## 2018-08-14 NOTE — ASSESSMENT & PLAN NOTE
The patient has a history of lumbar degenerative disc disease and is status post diskectomy with fusion  She is having recurrent radicular symptoms  We are going to treat her with a prednisone taper and give her some hydrocodone to take at bedtime  She has now allergy listed the hydrocodone but is GI intolerance not a true allergy  She is going to be careful with that as we discussed  She is going to observe blood sugar control with prednisone  She is going to call her Endocrinology as in regards to her insulin pump  She is going to call Dr Sheridan for re-evaluation if she is not seeing significant improvement over the next few days  She is going to call or seek more urgent medical attention if she develops incontinence of bowel or bladder weakness of her lower extremities  She agrees

## 2018-08-14 NOTE — PROGRESS NOTES
Assessment/Plan:  Type 1 diabetes mellitus without complication (HCC)  No results found for: HGBA1C    No results for input(s): POCGLU in the last 72 hours  Blood Sugar Average: Last 72 hrs:      She is going to need to be observant of the effect of the prednisone on her blood sugars  Lumbar radiculitis    The patient has a history of lumbar degenerative disc disease and is status post diskectomy with fusion  She is having recurrent radicular symptoms  We are going to treat her with a prednisone taper and give her some hydrocodone to take at bedtime  She has now allergy listed the hydrocodone but is GI intolerance not a true allergy  She is going to be careful with that as we discussed  She is going to observe blood sugar control with prednisone  She is going to call her Endocrinology as in regards to her insulin pump  She is going to call Dr Sheridan for re-evaluation if she is not seeing significant improvement over the next few days  She is going to call or seek more urgent medical attention if she develops incontinence of bowel or bladder weakness of her lower extremities  She agrees  Diagnoses and all orders for this visit:    Lumbar radiculitis  -     predniSONE 10 mg tablet; Take 1 tablet (10 mg total) by mouth daily 4 daily for 2 days then 3 daily for 2 days then 2 daily for 2 days then 1 daily for 4 days  -     HYDROcodone-acetaminophen (NORCO) 5-325 mg per tablet; Take 1 tablet by mouth every 6 (six) hours as needed for pain Max Daily Amount: 4 tablets    Type 1 diabetes mellitus without complication (HCC)    Other orders  -     atorvastatin (LIPITOR) 80 mg tablet; Take 80 mg by mouth daily          Subjective:   Chief Complaint   Patient presents with    Back Pain     pt states she has low back pain that is radiating down her R leg  she does have numbness and tingling into her foot  she had not had her mammo or pap done  Patient ID: Nanyc Dobbins is a 48 y o  female  My back aches like a tooth ache  Radiates down to foot  Front, back and groin  Toes spasm at times  Has nocturnal pain which awakens her  I cant rest on that side  No incontinence  R leg feels dead  Neck bad as well giving her L sided occipital Has  Had previous lumbar fusion  L5-S1 2006  Dr Isaac Tan  Taking only 1 Tylenol  Insulin pump  HPI  The patient is a 27-year-old female who presents today for worsening right lower back pain with symptoms radicular to her right lower extremity  She has had some numbness and tingling in her foot  She also has pain which radiates into her right groin as well as right thigh and posterior gluteal area  She states that time the right leg feels dad  She has had some spasms in her toe and she has nocturnal pain which awakens her  She cannot rest on her right side  She does note that she has had no incontinence of bowel or bladder and she does not have any definitive weakness of her lower extremities  She is a type 1 diabetic on an insulin pump  She also has significant asthma by history  As well as hypothyroidism and COPD  She has cervical degenerative disc disease as well as lumbar by history  She is status post L5-S1 fusion  She also has a history of rheumatoid arthritis and fibromyalgia  She has had no recent trauma  She is maintained on insulin pump  The following portions of the patient's history were reviewed and updated as appropriate: allergies, current medications, past family history, past medical history, past social history, past surgical history and problem list     Review of Systems   Constitution: Negative for chills, decreased appetite, fever, malaise/fatigue, night sweats and weight loss  Cardiovascular: Negative  Respiratory: Negative  Skin: Negative for rash  Musculoskeletal: Positive for back pain, muscle cramps, myalgias, neck pain and stiffness  Negative for falls     Gastrointestinal: Negative for bowel incontinence and constipation  Genitourinary: Negative for bladder incontinence  Neurological: Positive for headaches  Negative for dizziness and focal weakness  Psychiatric/Behavioral: Negative for depression, substance abuse and suicidal ideas  The patient has insomnia  The patient is not nervous/anxious  Objective:    Physical Exam   Constitutional: She is oriented to person, place, and time  She appears well-developed and well-nourished  She appears distressed  Mild distress due to low back pain   Eyes: Conjunctivae are normal    Neck: Neck supple  No JVD present  No thyromegaly present  Cardiovascular: Normal rate, regular rhythm and normal heart sounds  Pulmonary/Chest: Effort normal and breath sounds normal  No respiratory distress  She has no wheezes  She has no rales  Musculoskeletal: She exhibits tenderness  She exhibits no edema or deformity  She has tenderness in the right para lumbar region  Tenderness of the right SI region and right gluteal region as well as a sign notch  She has  Surgical scar in the right paralumbar region   Lymphadenopathy:     She has no cervical adenopathy  Neurological: She is alert and oriented to person, place, and time  She displays abnormal reflex  She exhibits normal muscle tone  Coordination normal    She has diminished ankle jerks though there are fairly equal   Knee jerks are equal bilaterally and 2+   Skin: No rash noted     Psychiatric:   In pain

## 2018-09-13 DIAGNOSIS — J45.909 ASTHMA, UNSPECIFIED ASTHMA SEVERITY, UNSPECIFIED WHETHER COMPLICATED, UNSPECIFIED WHETHER PERSISTENT: Primary | ICD-10-CM

## 2018-09-13 DIAGNOSIS — M54.2 CERVICALGIA: ICD-10-CM

## 2018-09-13 RX ORDER — GABAPENTIN 600 MG/1
600 TABLET ORAL 3 TIMES DAILY
Qty: 90 TABLET | Refills: 0 | Status: SHIPPED | OUTPATIENT
Start: 2018-09-13 | End: 2018-10-08 | Stop reason: SDUPTHER

## 2018-10-08 DIAGNOSIS — M54.2 CERVICALGIA: ICD-10-CM

## 2018-10-08 RX ORDER — GABAPENTIN 600 MG/1
600 TABLET ORAL 3 TIMES DAILY
Qty: 90 TABLET | Refills: 0 | Status: SHIPPED | OUTPATIENT
Start: 2018-10-08 | End: 2018-10-29 | Stop reason: SDUPTHER

## 2018-10-29 DIAGNOSIS — M54.2 CERVICALGIA: ICD-10-CM

## 2018-10-29 DIAGNOSIS — J45.909 ASTHMA, UNSPECIFIED ASTHMA SEVERITY, UNSPECIFIED WHETHER COMPLICATED, UNSPECIFIED WHETHER PERSISTENT: ICD-10-CM

## 2018-10-29 RX ORDER — GABAPENTIN 600 MG/1
600 TABLET ORAL 3 TIMES DAILY
Qty: 90 TABLET | Refills: 0 | Status: SHIPPED | OUTPATIENT
Start: 2018-10-29 | End: 2019-01-07 | Stop reason: SDUPTHER

## 2018-11-12 LAB — HBA1C MFR BLD HPLC: 8.5 %

## 2018-12-06 DIAGNOSIS — J45.909 ASTHMA, UNSPECIFIED ASTHMA SEVERITY, UNSPECIFIED WHETHER COMPLICATED, UNSPECIFIED WHETHER PERSISTENT: Primary | ICD-10-CM

## 2018-12-06 RX ORDER — ALBUTEROL SULFATE 2.5 MG/3ML
2.5 SOLUTION RESPIRATORY (INHALATION) 4 TIMES DAILY
Qty: 25 VIAL | Refills: 0 | Status: SHIPPED | OUTPATIENT
Start: 2018-12-06 | End: 2018-12-13 | Stop reason: SDUPTHER

## 2018-12-08 ENCOUNTER — OFFICE VISIT (OUTPATIENT)
Dept: URGENT CARE | Facility: CLINIC | Age: 53
End: 2018-12-08
Payer: COMMERCIAL

## 2018-12-08 VITALS
OXYGEN SATURATION: 98 % | HEART RATE: 78 BPM | SYSTOLIC BLOOD PRESSURE: 154 MMHG | BODY MASS INDEX: 21.86 KG/M2 | WEIGHT: 147.6 LBS | RESPIRATION RATE: 18 BRPM | TEMPERATURE: 97.8 F | HEIGHT: 69 IN | DIASTOLIC BLOOD PRESSURE: 80 MMHG

## 2018-12-08 DIAGNOSIS — J20.9 ACUTE BRONCHITIS, UNSPECIFIED ORGANISM: Primary | ICD-10-CM

## 2018-12-08 PROCEDURE — 99213 OFFICE O/P EST LOW 20 MIN: CPT | Performed by: PHYSICIAN ASSISTANT

## 2018-12-08 RX ORDER — AMOXICILLIN AND CLAVULANATE POTASSIUM 875; 125 MG/1; MG/1
1 TABLET, FILM COATED ORAL EVERY 12 HOURS SCHEDULED
Qty: 20 TABLET | Refills: 0 | Status: SHIPPED | OUTPATIENT
Start: 2018-12-08 | End: 2018-12-18

## 2018-12-08 RX ORDER — BENZONATATE 100 MG/1
100 CAPSULE ORAL 3 TIMES DAILY PRN
Qty: 20 CAPSULE | Refills: 0 | Status: SHIPPED | OUTPATIENT
Start: 2018-12-08 | End: 2019-01-11

## 2018-12-08 NOTE — PATIENT INSTRUCTIONS
1  Bronchitis  -Take antibiotic as directed with food  -Take cough medicine as directed  -Use inhaler or nebulizer that you have at home every 4-6 hours as needed  -Tylenol/Motrin  -Increase fluids  -Follow-up with PCP within 3-5 days if no improvement    Go to ER with worsening symptoms, fever, chest pain, shortness of breath, or any signs of distress    Acute Bronchitis   AMBULATORY CARE:   Acute bronchitis  is swelling and irritation in the air passages of your lungs  This irritation may cause you to cough or have other breathing problems  Acute bronchitis often starts because of another illness, such as a cold or the flu  The illness spreads from your nose and throat to your windpipe and airways  Bronchitis is often called a chest cold  Acute bronchitis lasts about 3 to 6 weeks and is usually not a serious illness  Your cough can last for several weeks  You may have any of the following symptoms:   · A cough with sputum that may be clear, yellow, or green    · Feeling more tired than usual, and body aches    · A fever and chills    · Wheezing when you breathe    · A tight chest or pain when you breathe or cough  Seek care immediately if:   · You cough up blood  · Your lips or fingernails turn blue  · You feel like you are not getting enough air when you breathe  Contact your healthcare provider if:   · You have a fever  · Your breathing problems do not go away or get worse  · Your cough does not get better within 4 weeks  · You have questions or concerns about your condition or care  Self-care:   · Get more rest   Rest helps your body to heal  Slowly start to do more each day  Rest when you feel it is needed  · Avoid irritants in the air  Avoid chemicals, fumes, and dust  Wear a face mask if you must work around dust or fumes  Stay inside on days when air pollution levels are high  If you have allergies, stay inside when pollen counts are high   Do not use aerosol products, such as spray-on deodorant, bug spray, and hair spray  · Do not smoke or be around others who smoke  Nicotine and other chemicals in cigarettes and cigars damages the cilia that move mucus out of your lungs  Ask your healthcare provider for information if you currently smoke and need help to quit  E-cigarettes or smokeless tobacco still contain nicotine  Talk to your healthcare provider before you use these products  · Drink liquids as directed  Liquids help keep your air passages moist and help you cough up mucus  You may need to drink more liquids when you have acute bronchitis  Ask how much liquid to drink each day and which liquids are best for you  · Use a humidifier or vaporizer  Use a cool mist humidifier or a vaporizer to increase air moisture in your home  This may make it easier for you to breathe and help decrease your cough  Prevent acute bronchitis by doing the following:   · Get the vaccinations you need  Ask your healthcare provider if you should get vaccinated against the flu or pneumonia  · Prevent the spread of germs  You can decrease your risk of acute bronchitis and other illnesses by doing the following:     [facility] [place] your hands often with soap and water  Carry germ-killing hand lotion or gel with you  You can use the lotion or gel to clean your hands when soap and water are not available  ¨ Do not touch your eyes, nose, or mouth unless you have washed your hands first     ¨ Always cover your mouth when you cough to prevent the spread of germs  It is best to cough into a tissue or your shirt sleeve instead of into your hand  Ask those around you cover their mouths when they cough  ¨ Try to avoid people who have a cold or the flu  If you are sick, stay away from others as much as possible  Medicines: Your healthcare provider may  give you any of the following:  · Ibuprofen or acetaminophen  are medicines that help lower your fever  They are available without a doctor's order   Ask your healthcare provider which medicine is right for you  Ask how much to take and how often to take it  Follow directions  These medicines can cause stomach bleeding if not taken correctly  Ibuprofen can cause kidney damage  Do not take ibuprofen if you have kidney disease, an ulcer, or allergies to aspirin  Acetaminophen can cause liver damage  Do not take more than 4,000 milligrams in 24 hours  · Decongestants  help loosen mucus in your lungs and make it easier to cough up  This can help you breathe easier  · Cough suppressants  decrease your urge to cough  If your cough produces mucus, do not take a cough suppressant unless your healthcare provider tells you to  Your healthcare provider may suggest that you take a cough suppressant at night so you can rest     · Inhalers  may be given  Your healthcare provider may give you one or more inhalers to help you breathe easier and cough less  An inhaler gives your medicine to open your airways  Ask your healthcare provider to show you how to use your inhaler correctly  Follow up with your healthcare provider as directed:  Write down questions you have so you will remember to ask them during your follow-up visits  © 2017 [facility] Information is for End User's use only and may not be sold, redistributed or otherwise used for commercial purposes  All illustrations and images included in CareNotes® are the copyrighted property of [employer]  or [employer]  The above information is an  only  It is not intended as medical advice for individual conditions or treatments  Talk to your doctor, nurse or pharmacist before following any medical regimen to see if it is safe and effective for you

## 2018-12-08 NOTE — PROGRESS NOTES
[employer]        NAME: Hiwot Alston is a 48 y o  female  : 1965    MRN: [id number]  DATE: 2018  TIME: 8:29 AM    Assessment and Plan   Acute bronchitis, unspecified organism [J20 9]  1  Acute bronchitis, unspecified organism  amoxicillin-clavulanate (AUGMENTIN) 875-125 mg per tablet    benzonatate (TESSALON PERLES) 100 mg capsule         Patient Instructions     1  Bronchitis  -Patient declines neb in office and prefers to do it when she gets home  -Take antibiotic as directed with food  -Take cough medicine as directed  -Use inhaler or nebulizer that you have at home every 4-6 hours as needed  -Tylenol/Motrin  -Increase fluids  -Follow-up with PCP within 3-5 days if no improvement    Go to ER with worsening symptoms, fever, chest pain, shortness of breath, or any signs of distress    Chief Complaint     Chief Complaint   Patient presents with    Cold Like Symptoms     x 1 month  complains of productive cough, sinus congestion, green mucus    Cough         History of Present Illness       The patient is a 30-year-old female smoker with a medical history of asthma, diabetes and hypothyroidism who presents today for evaluation of cold symptoms had been present for the past month  Patient states that her symptoms started with a runny nose and sinus congestion however seems to now have progressed into her chest   Patient admits to having a productive cough with green mucus  She also continues to have sinus congestion  Patient states that she has not tried any over-the-counter medicines for her symptoms  Patient states she has been using her albuterol and nebulizer with some relief  She admits to having some low grade fevers at home  Review of Systems   Review of Systems   Constitutional: Positive for fever  Negative for chills  HENT: Positive for rhinorrhea, sinus pain and sinus pressure  Negative for ear pain and sore throat      Respiratory: Positive for cough and wheezing  Negative for shortness of breath  Cardiovascular: Negative for chest pain  Musculoskeletal: Negative for arthralgias  Neurological: Negative for headaches  Current Medications       Current Outpatient Prescriptions:     albuterol (2 5 mg/3 mL) 0 083 % nebulizer solution, Take 1 vial (2 5 mg total) by nebulization 4 (four) times a day, Disp: 25 vial, Rfl: 0    atorvastatin (LIPITOR) 80 mg tablet, Take 80 mg by mouth daily, Disp: , Rfl: 3    Blood Glucose Calibration (OT ULTRA/FASTTK CNTRL SOLN) SOLN, by In Vitro route, Disp: , Rfl:     cetirizine (ZyrTEC) 10 mg tablet, Take 10 mg by mouth daily  , Disp: , Rfl:     gabapentin (NEURONTIN) 600 MG tablet, Take 1 tablet (600 mg total) by mouth 3 (three) times a day, Disp: 90 tablet, Rfl: 0    insulin aspart (NOVOLOG) 100 units/mL injection, Inject 50 Units under the skin  , Disp: , Rfl:     Lancet Devices (LANCING DEVICE) MISC, by Does not apply route, Disp: , Rfl:     levothyroxine 112 mcg tablet, Take 112 mcg by mouth daily, Disp: , Rfl: 0    ONE TOUCH ULTRA TEST test strip, , Disp: , Rfl:     PROAIR  (90 Base) MCG/ACT inhaler, Inhale 2 puffs 4 (four) times a day As directed, Disp: 1 Inhaler, Rfl: 1    Respiratory Therapy Supplies (JET-AIR REUSABLE NEBULIZER SYS) KIT, by Does not apply route, Disp: , Rfl:     amoxicillin-clavulanate (AUGMENTIN) 875-125 mg per tablet, Take 1 tablet by mouth every 12 (twelve) hours for 10 days, Disp: 20 tablet, Rfl: 0    benzonatate (TESSALON PERLES) 100 mg capsule, Take 1 capsule (100 mg total) by mouth 3 (three) times a day as needed for cough, Disp: 20 capsule, Rfl: 0    HYDROcodone-acetaminophen (NORCO) 5-325 mg per tablet, Take 1 tablet by mouth every 6 (six) hours as needed for pain Max Daily Amount: 4 tablets (Patient not taking: Reported on 12/8/2018 ), Disp: 30 tablet, Rfl: 0    montelukast (SINGULAIR) 10 mg tablet, TAKE 1 TABLET DAILY (Patient not taking: Reported on 12/8/2018), Disp: 30 tablet, Rfl: 5    Current Allergies     Allergies as of 12/08/2018 - Reviewed 12/08/2018   Allergen Reaction Noted    Celecoxib GI Bleeding 09/06/2012    Codeine GI Intolerance 08/15/2016    Motrin [ibuprofen] GI Intolerance 08/15/2016    Oxycodone  03/17/2016    Vicodin [hydrocodone-acetaminophen] GI Intolerance 08/15/2016            The following portions of the patient's history were reviewed and updated as appropriate: allergies, current medications, past family history, past medical history, past social history, past surgical history and problem list      Past Medical History:   Diagnosis Date    Asthma     Diabetes mellitus ([facility] [address] )     Disease of thyroid gland     Fibromyalgia syndrome     High cholesterol     Neuropathic pain of both legs     PONV (postoperative nausea and vomiting)        Past Surgical History:   Procedure Laterality Date    BACK SURGERY      l5 - s1 fused    CARPAL TUNNEL RELEASE Left     HYSTERECTOMY      SC INCISE FINGER TENDON SHEATH Left 8/16/2016    Procedure: LONG TRIGGER FINGER RELEASE ;  Surgeon: Omar Souza MD;  [facility];  Service: Orthopedics    SC INCISE FINGER TENDON SHEATH Right 6/9/2017    Procedure: THUMB TRIGGER RELEASE;  Surgeon: Omar Souza MD;  [facility];  Service: Orthopedics    SC WRIST Kiesha Carls LIG Right 10/14/2016    Procedure: ENDOSCOPIC CARPAL TUNNEL RELEASE ;  Surgeon: Omar Souza MD;  [facility];  Service: Orthopedics    SC WRIST Kiesha Carls LIG Left 8/16/2016    Procedure: ENDOSCOPIC CARPAL TUNNEL RELEASE ;  Surgeon: Omar Souza MD;  [facility];  Service: Orthopedics    ULNAR NERVE TRANSPOSITION Right        Family History   Problem Relation Age of Onset    Hyperlipidemia Mother     Hypertension Mother     Heart disease Mother     Hyperlipidemia Father     Hypertension Father     Hyperlipidemia Brother     Lumbar disc disease Brother     No Known Problems Son     No Known Problems Daughter          Medications have been verified  Objective   /80 (BP Location: Left arm, Patient Position: Sitting)   Pulse 78   Temp 97 8 °F (36 6 °C) (Tympanic)   Resp 18   Ht 5' 9" (1 753 m)   Wt 67 kg (147 lb 9 6 oz)   SpO2 98%   BMI 21 80 kg/m²        Physical Exam     Physical Exam   Constitutional: She is oriented to person, place, and time  She appears well-developed and well-nourished  No distress  HENT:   Right Ear: Tympanic membrane and external ear normal    Left Ear: Tympanic membrane and external ear normal    Nose: Right sinus exhibits frontal sinus tenderness  Left sinus exhibits frontal sinus tenderness  Mouth/Throat: Uvula is midline, oropharynx is clear and moist and mucous membranes are normal    Eyes: Pupils are equal, round, and reactive to light  Conjunctivae and EOM are normal    Neck: Normal range of motion  Neck supple  Cardiovascular: Normal rate, regular rhythm and normal heart sounds  Pulmonary/Chest: She has wheezes in the right middle field, the right lower field, the left middle field and the left lower field  Lymphadenopathy:     She has no cervical adenopathy  Neurological: She is alert and oriented to person, place, and time  Skin: Skin is warm and dry  Psychiatric: She has a normal mood and affect  Nursing note and vitals reviewed

## 2018-12-13 ENCOUNTER — OFFICE VISIT (OUTPATIENT)
Dept: FAMILY MEDICINE CLINIC | Facility: CLINIC | Age: 53
End: 2018-12-13
Payer: COMMERCIAL

## 2018-12-13 VITALS
BODY MASS INDEX: 21.33 KG/M2 | OXYGEN SATURATION: 97 % | WEIGHT: 144 LBS | TEMPERATURE: 98.1 F | SYSTOLIC BLOOD PRESSURE: 150 MMHG | DIASTOLIC BLOOD PRESSURE: 80 MMHG | HEIGHT: 69 IN | HEART RATE: 84 BPM

## 2018-12-13 DIAGNOSIS — J45.909 ASTHMA, UNSPECIFIED ASTHMA SEVERITY, UNSPECIFIED WHETHER COMPLICATED, UNSPECIFIED WHETHER PERSISTENT: ICD-10-CM

## 2018-12-13 DIAGNOSIS — J01.00 ACUTE MAXILLARY SINUSITIS, RECURRENCE NOT SPECIFIED: Primary | ICD-10-CM

## 2018-12-13 DIAGNOSIS — J45.909 PERSISTENT ASTHMA WITHOUT COMPLICATION, UNSPECIFIED ASTHMA SEVERITY: ICD-10-CM

## 2018-12-13 PROCEDURE — 99213 OFFICE O/P EST LOW 20 MIN: CPT | Performed by: FAMILY MEDICINE

## 2018-12-13 PROCEDURE — 3008F BODY MASS INDEX DOCD: CPT | Performed by: FAMILY MEDICINE

## 2018-12-13 RX ORDER — ALBUTEROL SULFATE 2.5 MG/3ML
2.5 SOLUTION RESPIRATORY (INHALATION) 4 TIMES DAILY
Qty: 25 VIAL | Refills: 5 | Status: SHIPPED | OUTPATIENT
Start: 2018-12-13 | End: 2019-02-09 | Stop reason: SDUPTHER

## 2018-12-13 RX ORDER — PREDNISONE 10 MG/1
10 TABLET ORAL DAILY
Qty: 22 TABLET | Refills: 0 | Status: SHIPPED | OUTPATIENT
Start: 2018-12-13 | End: 2019-01-11

## 2018-12-13 RX ORDER — MONTELUKAST SODIUM 10 MG/1
10 TABLET ORAL DAILY
Qty: 30 TABLET | Refills: 5 | Status: SHIPPED | OUTPATIENT
Start: 2018-12-13 | End: 2019-07-15 | Stop reason: SDUPTHER

## 2018-12-13 NOTE — PROGRESS NOTES
Assessment/Plan:  Acute maxillary sinusitis  She is going to finish the Augmentin given to her by urgent care center  She is going to continue to use albuterol by nebulization  She also continue with Singulair and antihistamines  Though she does have insulin-dependent diabetes, we are going to give her prednisone taper  I feel that the benefits as to relieving her acute asthma attack and that potential positive impact on her blood sugar outweighs the risk of the steroids potentially raising her sugar  She is going to keep monitoring this and adjust her insulin as necessary  She agrees  If she is not feeling of her symptoms are resolved by next Monday she will call us or sooner as needed  She agrees  Diagnoses and all orders for this visit:    Acute maxillary sinusitis, recurrence not specified    Asthma, unspecified asthma severity, unspecified whether complicated, unspecified whether persistent  -     albuterol (2 5 mg/3 mL) 0 083 % nebulizer solution; Take 1 vial (2 5 mg total) by nebulization 4 (four) times a day  -     predniSONE 10 mg tablet; Take 1 tablet (10 mg total) by mouth daily 4 daily for 2 days then 3 daily for 2 days then 2 daily for 2 days then 1 daily for 4 days    Persistent asthma without complication, unspecified asthma severity  -     montelukast (SINGULAIR) 10 mg tablet; Take 1 tablet (10 mg total) by mouth daily          Subjective:   Chief Complaint   Patient presents with    Sinus Problem     congestion and lots of chest congestion and fever at times  Started over a month ago  Now purulent nasal d/c with heme  Tinge  Given Ab and cough pills at [facility] in [place]  Pleuritic pain and nocturnal sx  ST  Given Augmentin without improvement  BS are 200-300  Wheezing and BRINK with stairs  Patient ID: Noel Leach is a 48 y o  female      HPI  The patient is a 77-year-old insulin dependent diabetic additionally suffers from COPD and asthma in addition to hypothyroidism among other multiple other conditions  She states that she started with increasing congestion for over a month  She tried to take care for herself at home by using her home nebulizer  Over the weekend she noted she was having persistent of purulent nasal discharge with heme tinge  It was working its way down into her chest, so she decided to visit urgent care center and [facility]  She was treated with Augmentin and Tessalon  She states that she continues to be symptomatic with facial pressure congestion, cough wheezing with nocturnal symptoms  The following portions of the patient's history were reviewed and updated as appropriate: allergies, current medications, past family history, past medical history, past social history, past surgical history and problem list     ROS    Limited review of systems as per the HPI  Objective:    Physical Exam   Constitutional: She is oriented to person, place, and time  She appears well-developed and well-nourished  HENT:   Mouth/Throat: No oropharyngeal exudate  Tender maxilla bilaterally  Erythematous nasal turbinates with thick yellow nasal discharge and postnasal drip  Eyes: Conjunctivae are normal  Right eye exhibits no discharge  Left eye exhibits no discharge  No scleral icterus  Neck: Neck supple  No JVD present  Cardiovascular: Normal rate and regular rhythm  Pulmonary/Chest: Effort normal  No respiratory distress  She has wheezes  She has mild to moderate expiratory delay with diffuse expiratory wheezing throughout  No crackles or rhonchi noted presently  Lymphadenopathy:     She has no cervical adenopathy  Neurological: She is alert and oriented to person, place, and time  Psychiatric: She has a normal mood and affect  Thought content normal    Nursing note and vitals reviewed

## 2018-12-13 NOTE — ASSESSMENT & PLAN NOTE
She is going to finish the Augmentin given to her by urgent care center  She is going to continue to use albuterol by nebulization  She also continue with Singulair and antihistamines  Though she does have insulin-dependent diabetes, we are going to give her prednisone taper  I feel that the benefits as to relieving her acute asthma attack and that potential positive impact on her blood sugar outweighs the risk of the steroids potentially raising her sugar  She is going to keep monitoring this and adjust her insulin as necessary  She agrees  If she is not feeling of her symptoms are resolved by next Monday she will call us or sooner as needed  She agrees

## 2018-12-14 DIAGNOSIS — J45.909 ASTHMA, UNSPECIFIED ASTHMA SEVERITY, UNSPECIFIED WHETHER COMPLICATED, UNSPECIFIED WHETHER PERSISTENT: ICD-10-CM

## 2019-01-07 DIAGNOSIS — M54.2 CERVICALGIA: ICD-10-CM

## 2019-01-07 RX ORDER — GABAPENTIN 600 MG/1
TABLET ORAL
Qty: 90 TABLET | Refills: 0 | Status: SHIPPED | OUTPATIENT
Start: 2019-01-07 | End: 2019-02-07 | Stop reason: SDUPTHER

## 2019-01-11 ENCOUNTER — OFFICE VISIT (OUTPATIENT)
Dept: FAMILY MEDICINE CLINIC | Facility: CLINIC | Age: 54
End: 2019-01-11
Payer: COMMERCIAL

## 2019-01-11 VITALS
SYSTOLIC BLOOD PRESSURE: 162 MMHG | BODY MASS INDEX: 20.73 KG/M2 | DIASTOLIC BLOOD PRESSURE: 84 MMHG | WEIGHT: 140 LBS | HEIGHT: 69 IN

## 2019-01-11 DIAGNOSIS — F43.22 ACUTE ADJUSTMENT DISORDER WITH ANXIETY: Primary | ICD-10-CM

## 2019-01-11 DIAGNOSIS — R07.9 CHEST PAIN, UNSPECIFIED TYPE: ICD-10-CM

## 2019-01-11 PROCEDURE — 99214 OFFICE O/P EST MOD 30 MIN: CPT | Performed by: FAMILY MEDICINE

## 2019-01-11 RX ORDER — ESCITALOPRAM OXALATE 5 MG/1
5 TABLET ORAL DAILY
Qty: 30 TABLET | Refills: 0 | Status: SHIPPED | OUTPATIENT
Start: 2019-01-11 | End: 2019-02-07 | Stop reason: SDUPTHER

## 2019-01-11 NOTE — ASSESSMENT & PLAN NOTE
The patient has some right lateral lower chest pain with tenderness  There is no specific crepitus, step-off or deformity  Cannot rule out rib fracture based on history of playing football with her nephew's just prior to development of this pain  I hear no crackles or rub to suggest intrathoracic cause of her pain and again she does have tenderness to palpation  Will have her get rib films follow up on these results are available

## 2019-01-11 NOTE — PROGRESS NOTES
Assessment/Plan:  Acute adjustment disorder with anxiety  Patient is a 68-year-old female with multiple medical problems who presents today with acute adjustment disorder associated with anxiety  This is related to marital dysfunction  She is not suicidal   We are going to start her on Lexapro 5 mg daily  We advised her as the potential side effects such as nausea, difficulty sleep, lightheadedness dizziness, suicidality X cetera  She acknowledges understanding these potential side effects  We are going to see her back in 3 weeks  She will call sooner as needed  She agrees  Chest pain  The patient has some right lateral lower chest pain with tenderness  There is no specific crepitus, step-off or deformity  Cannot rule out rib fracture based on history of playing football with her nephew's just prior to development of this pain  I hear no crackles or rub to suggest intrathoracic cause of her pain and again she does have tenderness to palpation  Will have her get rib films follow up on these results are available  We spent 25 minutes discussing the patient's situation with her and counseling her as well  Diagnoses and all orders for this visit:    Acute adjustment disorder with anxiety  -     escitalopram (LEXAPRO) 5 mg tablet; Take 1 tablet (5 mg total) by mouth daily    Chest pain, unspecified type  -     XR ribs right w pa chest min 3 views; Future          Subjective:   Chief Complaint   Patient presents with    Anxiety    Stress    told her before Thanksgiving that he loves her but not in love with her  Wanted to continue living with her  [place] he told her that he didn't want to be with her anymore  Had a hypoglycemic episode  Did not seem concerned about her  Emotionally detached at times but civil at other times  Panicking, crying  She also complains of pain of her right lateral thorax which is tender with palpation as well as coughing    She was playing football with her nephew's just prior to development of this pain 2 weeks ago  Patient ID: Adrián Roberto is a 48 y o  female  HPI  The patient is a 79-year-old female with multiple medical problems including insulin-dependent diabetes who states that shortly before Thanksgiving her  told her that he was not love with her  He initially stated he wanted to continue living with her but around new year's Day told her that he did not want to be with her anymore  He has been emotionally distant  Not concerned about her as evidence by lack of response to a severe hypoglycemic episode that she had  She notes that she has had symptoms of panic with rapid heartbeat shortness of breath dizziness and she has had episodes of crying  She does not feel suicidal   She has good family support with her children  She has plans to see an  on Monday  Right lateral rib pain  Tenderness with palpation as well as coughing and sneezing  Developed after playing football with her nephew's approximately 2 weeks ago  No shortness of breath or hemoptysis  The following portions of the patient's history were reviewed and updated as appropriate: allergies, current medications, past family history, past medical history, past social history, past surgical history and problem list     Review of Systems   Constitution: Negative for decreased appetite, weight gain and weight loss  Cardiovascular: Positive for chest pain and palpitations  Negative for dyspnea on exertion, irregular heartbeat and leg swelling  Atypical right-sided chest pain with tenderness of chest wall   Respiratory: Positive for wheezing  Negative for shortness of breath, sleep disturbances due to breathing and sputum production  Skin: Positive for flushing  Psychiatric/Behavioral: Negative for depression and suicidal ideas  The patient has insomnia and is nervous/anxious            Objective:    Physical Exam   Constitutional: She is oriented to person, place, and time  She appears well-developed and well-nourished  Neck: Neck supple  Cardiovascular: Normal rate and regular rhythm  Pulmonary/Chest: Effort normal  No respiratory distress  She has wheezes  She has no rales  No crackle or rub noted in the area of chest discomfort  Musculoskeletal: She exhibits tenderness  She exhibits no deformity  Tenderness of right lower lateral thoracic wall  No step-off or deformity  Neurological: She is alert and oriented to person, place, and time  Psychiatric:   Anxious and intermittently tearful  Nursing note and vitals reviewed

## 2019-01-11 NOTE — ASSESSMENT & PLAN NOTE
Patient is a 80-year-old female with multiple medical problems who presents today with acute adjustment disorder associated with anxiety  This is related to marital dysfunction  She is not suicidal   We are going to start her on Lexapro 5 mg daily  We advised her as the potential side effects such as nausea, difficulty sleep, lightheadedness dizziness, suicidality X cetera  She acknowledges understanding these potential side effects  We are going to see her back in 3 weeks  She will call sooner as needed  She agrees

## 2019-01-12 ENCOUNTER — APPOINTMENT (OUTPATIENT)
Dept: RADIOLOGY | Facility: CLINIC | Age: 54
End: 2019-01-12
Payer: COMMERCIAL

## 2019-01-12 DIAGNOSIS — R07.9 CHEST PAIN, UNSPECIFIED TYPE: ICD-10-CM

## 2019-01-12 PROCEDURE — 71101 X-RAY EXAM UNILAT RIBS/CHEST: CPT

## 2019-01-27 ENCOUNTER — OFFICE VISIT (OUTPATIENT)
Dept: URGENT CARE | Facility: CLINIC | Age: 54
End: 2019-01-27
Payer: COMMERCIAL

## 2019-01-27 VITALS
HEART RATE: 96 BPM | RESPIRATION RATE: 18 BRPM | WEIGHT: 139 LBS | TEMPERATURE: 97.1 F | DIASTOLIC BLOOD PRESSURE: 64 MMHG | OXYGEN SATURATION: 97 % | HEIGHT: 69 IN | BODY MASS INDEX: 20.59 KG/M2 | SYSTOLIC BLOOD PRESSURE: 144 MMHG

## 2019-01-27 DIAGNOSIS — J45.901 ASTHMATIC BRONCHITIS WITH ACUTE EXACERBATION, UNSPECIFIED ASTHMA SEVERITY, UNSPECIFIED WHETHER PERSISTENT: ICD-10-CM

## 2019-01-27 DIAGNOSIS — J01.00 ACUTE MAXILLARY SINUSITIS, RECURRENCE NOT SPECIFIED: Primary | ICD-10-CM

## 2019-01-27 PROCEDURE — 99213 OFFICE O/P EST LOW 20 MIN: CPT | Performed by: PHYSICIAN ASSISTANT

## 2019-01-27 RX ORDER — AZITHROMYCIN 250 MG/1
TABLET, FILM COATED ORAL
Qty: 6 TABLET | Refills: 0 | Status: SHIPPED | OUTPATIENT
Start: 2019-01-27 | End: 2019-01-31

## 2019-01-27 RX ORDER — PREDNISONE 10 MG/1
TABLET ORAL
Qty: 18 TABLET | Refills: 0 | Status: SHIPPED | OUTPATIENT
Start: 2019-01-27 | End: 2019-02-15 | Stop reason: ALTCHOICE

## 2019-01-27 NOTE — PATIENT INSTRUCTIONS
I discussed with the patient the use of steroids and her insulin  She has used some in the past   She was prescribed him from her family doctor  She knows how to adjust her insulin if her sugars go up  She would benefit from the steroids due to the acute flare of her asthmatic bronchitis in her chest   No fever and pulse ox is adequate  Follow-up with PCP  Follow up with PCP in 3-5 days  Proceed to  ER if symptoms worsen

## 2019-01-27 NOTE — PROGRESS NOTES
[facility]        NAME: Adrián Roberto is a 48 y o  female  : 1965    MRN: [id number]  DATE: 2019  TIME: 9:46 AM    Assessment and Plan   Acute maxillary sinusitis, recurrence not specified [J01 00]  1  Acute maxillary sinusitis, recurrence not specified  azithromycin (ZITHROMAX) 250 mg tablet    predniSONE 10 mg tablet   2  Asthmatic bronchitis with acute exacerbation, unspecified asthma severity, unspecified whether persistent  azithromycin (ZITHROMAX) 250 mg tablet    predniSONE 10 mg tablet         Patient Instructions       I discussed with the patient the use of steroids and her insulin  She has used some in the past   She was prescribed him from her family doctor  She knows how to adjust her insulin if her sugars go up  She would benefit from the steroids due to the acute flare of her asthmatic bronchitis in her chest   No fever and pulse ox is adequate  Follow-up with PCP  Follow up with PCP in 3-5 days  Proceed to  ER if symptoms worsen  Chief Complaint     Chief Complaint   Patient presents with    Cough     x1 week with a productive cough ,wheezing and sob, mild fevers    Sinusitis     x3 days with sinus pressure, nasal congestion with a post-nasal drip         History of Present Illness         48year-old female complains of 2 weeks of cough and congestion with sinus pressure  She has a history of asthmatic bronchitis  She takes albuterol at home  She has a nebulizer this morning  No fever home  No vomiting  No other medicines over-the-counter for this          Review of Systems   Review of Systems      Current Medications       Current Outpatient Prescriptions:     albuterol (2 5 mg/3 mL) 0 083 % nebulizer solution, Take 1 vial (2 5 mg total) by nebulization 4 (four) times a day, Disp: 25 vial, Rfl: 5    atorvastatin (LIPITOR) 80 mg tablet, Take 80 mg by mouth daily, Disp: , Rfl: 3    Blood Glucose Calibration (OT ULTRA/FASTTK CNTRL SOLN) SOLN, by In Vitro route, Disp: , Rfl:     cetirizine (ZyrTEC) 10 mg tablet, Take 10 mg by mouth daily  , Disp: , Rfl:     escitalopram (LEXAPRO) 5 mg tablet, Take 1 tablet (5 mg total) by mouth daily, Disp: 30 tablet, Rfl: 0    gabapentin (NEURONTIN) 600 MG tablet, TAKE 1 TABLET BY MOUTH THREE TIMES A DAY, Disp: 90 tablet, Rfl: 0    insulin aspart (NOVOLOG) 100 units/mL injection, Inject 50 Units under the skin  , Disp: , Rfl:     Lancet Devices (LANCING DEVICE) MISC, by Does not apply route, Disp: , Rfl:     levothyroxine 112 mcg tablet, Take 112 mcg by mouth daily, Disp: , Rfl: 0    montelukast (SINGULAIR) 10 mg tablet, Take 1 tablet (10 mg total) by mouth daily, Disp: 30 tablet, Rfl: 5    ONE TOUCH ULTRA TEST test strip, , Disp: , Rfl:     PROAIR  (90 Base) MCG/ACT inhaler, Inhale 2 puffs 4 (four) times a day As directed, Disp: 1 Inhaler, Rfl: 1    Respiratory Therapy Supplies (JET-AIR REUSABLE NEBULIZER SYS) KIT, by Does not apply route, Disp: , Rfl:     azithromycin (ZITHROMAX) 250 mg tablet, 2 tabs on day 1, then 1 tab daily for 4 days, Disp: 6 tablet, Rfl: 0    predniSONE 10 mg tablet, 3 tabs daily for 3 days, 2 tabs daily for 3 days, 1 tab daily for 3 days, Disp: 18 tablet, Rfl: 0    Current Allergies     Allergies as of 01/27/2019 - Reviewed 01/27/2019   Allergen Reaction Noted    Celecoxib GI Bleeding 09/06/2012    Codeine GI Intolerance 08/15/2016    Motrin [ibuprofen] GI Intolerance 08/15/2016    Oxycodone  03/17/2016    Vicodin [hydrocodone-acetaminophen] GI Intolerance 08/15/2016            The following portions of the patient's history were reviewed and updated as appropriate: allergies, current medications, past family history, past medical history, past social history, past surgical history and problem list      Past Medical History:   Diagnosis Date    Asthma     Diabetes mellitus ([address] )     Disease of thyroid gland     Fibromyalgia syndrome     High cholesterol     Neuropathic pain of both legs     PONV (postoperative nausea and vomiting)        Past Surgical History:   Procedure Laterality Date    BACK SURGERY      l5 - s1 fused    CARPAL TUNNEL RELEASE Left     HYSTERECTOMY      CO INCISE FINGER TENDON SHEATH Left 8/16/2016    Procedure: LONG TRIGGER FINGER RELEASE ;  Surgeon: Veronique Love MD;  [facility];  Service: Orthopedics    CO INCISE FINGER TENDON SHEATH Right 6/9/2017    Procedure: THUMB TRIGGER RELEASE;  Surgeon: Veronique Love MD;  [facility];  Service: Orthopedics    CO WRIST Charmel Rip LIG Right 10/14/2016    Procedure: ENDOSCOPIC CARPAL TUNNEL RELEASE ;  Surgeon: Veronique Love MD;  [facility];  Service: Orthopedics    CO WRIST Charmel Rip LIG Left 8/16/2016    Procedure: ENDOSCOPIC CARPAL TUNNEL RELEASE ;  Surgeon: Veronique Love MD;  [facility];  Service: Orthopedics    ULNAR NERVE TRANSPOSITION Right        Family History   Problem Relation Age of Onset    Hyperlipidemia Mother     Hypertension Mother     Heart disease Mother     Hyperlipidemia Father     Hypertension Father     Hyperlipidemia Brother     Lumbar disc disease Brother     No Known Problems Son     No Known Problems Daughter          Medications have been verified  Objective   /64   Pulse 96   Temp (!) 97 1 °F (36 2 °C) (Temporal)   Resp 18   Ht 5' 9" (1 753 m)   Wt 63 kg (139 lb)   SpO2 97%   BMI 20 53 kg/m²        Physical Exam     Physical Exam   Constitutional: She appears well-developed and well-nourished  No distress  HENT:   Right Ear: Tympanic membrane, external ear and ear canal normal    Left Ear: Tympanic membrane, external ear and ear canal normal    Nose: Mucosal edema present  No rhinorrhea  Right sinus exhibits maxillary sinus tenderness  Right sinus exhibits no frontal sinus tenderness  Left sinus exhibits maxillary sinus tenderness   Left sinus exhibits no frontal sinus tenderness  Mouth/Throat: Oropharynx is clear and moist  No posterior oropharyngeal erythema  Eyes: Pupils are equal, round, and reactive to light  Conjunctivae and EOM are normal  No scleral icterus  Neck: Normal range of motion  Neck supple  Cardiovascular: Normal rate, regular rhythm and normal heart sounds  Pulmonary/Chest: Effort normal  No accessory muscle usage  No respiratory distress  She has no decreased breath sounds  She has wheezes  She has rhonchi  She has no rales  Abdominal: Soft  Bowel sounds are normal  She exhibits no distension and no mass  There is no tenderness  There is no rebound and no guarding  Lymphadenopathy:     She has no cervical adenopathy  Skin: Skin is warm and dry  No rash noted

## 2019-02-07 DIAGNOSIS — M54.2 CERVICALGIA: ICD-10-CM

## 2019-02-07 DIAGNOSIS — F43.22 ACUTE ADJUSTMENT DISORDER WITH ANXIETY: ICD-10-CM

## 2019-02-07 RX ORDER — GABAPENTIN 600 MG/1
TABLET ORAL
Qty: 90 TABLET | Refills: 0 | Status: SHIPPED | OUTPATIENT
Start: 2019-02-07 | End: 2019-02-15 | Stop reason: SDUPTHER

## 2019-02-07 RX ORDER — ESCITALOPRAM OXALATE 5 MG/1
TABLET ORAL
Qty: 30 TABLET | Refills: 0 | Status: SHIPPED | OUTPATIENT
Start: 2019-02-07 | End: 2019-02-15 | Stop reason: SDUPTHER

## 2019-02-09 DIAGNOSIS — J45.909 ASTHMA, UNSPECIFIED ASTHMA SEVERITY, UNSPECIFIED WHETHER COMPLICATED, UNSPECIFIED WHETHER PERSISTENT: ICD-10-CM

## 2019-02-09 RX ORDER — ALBUTEROL SULFATE 2.5 MG/3ML
2.5 SOLUTION RESPIRATORY (INHALATION) 4 TIMES DAILY
Qty: 75 ML | Refills: 0 | Status: SHIPPED | OUTPATIENT
Start: 2019-02-09 | End: 2019-02-26 | Stop reason: SDUPTHER

## 2019-02-15 ENCOUNTER — OFFICE VISIT (OUTPATIENT)
Dept: FAMILY MEDICINE CLINIC | Facility: CLINIC | Age: 54
End: 2019-02-15
Payer: COMMERCIAL

## 2019-02-15 VITALS
WEIGHT: 135 LBS | TEMPERATURE: 97.5 F | HEIGHT: 69 IN | BODY MASS INDEX: 19.99 KG/M2 | SYSTOLIC BLOOD PRESSURE: 130 MMHG | HEART RATE: 69 BPM | DIASTOLIC BLOOD PRESSURE: 82 MMHG | OXYGEN SATURATION: 97 %

## 2019-02-15 DIAGNOSIS — F43.22 ACUTE ADJUSTMENT DISORDER WITH ANXIETY: ICD-10-CM

## 2019-02-15 DIAGNOSIS — M54.2 CERVICALGIA: ICD-10-CM

## 2019-02-15 DIAGNOSIS — J45.41 MODERATE PERSISTENT ASTHMA WITH ACUTE EXACERBATION: Primary | ICD-10-CM

## 2019-02-15 PROBLEM — L02.219 ABSCESS OF PUBIC REGION: Status: RESOLVED | Noted: 2018-03-09 | Resolved: 2019-02-15

## 2019-02-15 PROBLEM — R07.9 CHEST PAIN: Status: RESOLVED | Noted: 2019-01-11 | Resolved: 2019-02-15

## 2019-02-15 PROBLEM — J01.00 ACUTE MAXILLARY SINUSITIS: Status: RESOLVED | Noted: 2018-12-13 | Resolved: 2019-02-15

## 2019-02-15 PROBLEM — A49.02 MRSA INFECTION: Status: RESOLVED | Noted: 2018-03-13 | Resolved: 2019-02-15

## 2019-02-15 PROCEDURE — 3008F BODY MASS INDEX DOCD: CPT | Performed by: FAMILY MEDICINE

## 2019-02-15 PROCEDURE — 99214 OFFICE O/P EST MOD 30 MIN: CPT | Performed by: FAMILY MEDICINE

## 2019-02-15 RX ORDER — ESCITALOPRAM OXALATE 5 MG/1
5 TABLET ORAL DAILY
Qty: 30 TABLET | Refills: 3 | Status: SHIPPED | OUTPATIENT
Start: 2019-02-15 | End: 2019-03-20 | Stop reason: SDUPTHER

## 2019-02-15 RX ORDER — GABAPENTIN 600 MG/1
600 TABLET ORAL 3 TIMES DAILY
Qty: 90 TABLET | Refills: 5 | Status: SHIPPED | OUTPATIENT
Start: 2019-02-15 | End: 2019-11-30 | Stop reason: SDUPTHER

## 2019-02-15 NOTE — PROGRESS NOTES
Assessment/Plan:  Asthma  We are going to begin her on Advair 115  She is asked to call in 1 week with report of her condition  She agrees  She will continue with her albuterol as needed  Allergic rhinitis  Fluticasone nasal spray to be used 2 sprays each near daily  Acute adjustment disorder with anxiety  Continue Lexapro  Much improved  Recheck 3-6 months or sooner as needed  Diagnoses and all orders for this visit:    Moderate persistent asthma with acute exacerbation  -     fluticasone-salmeterol (ADVAIR HFA) 115-21 MCG/ACT inhaler; Inhale 2 puffs 2 (two) times a day Rinse mouth after use  Cervicalgia  -     gabapentin (NEURONTIN) 600 MG tablet; Take 1 tablet (600 mg total) by mouth 3 (three) times a day    Acute adjustment disorder with anxiety  -     escitalopram (LEXAPRO) 5 mg tablet; Take 1 tablet (5 mg total) by mouth daily          Subjective:   Chief Complaint   Patient presents with    Depression     here a recheck on her meds  Initially had some nausea but resolved  Sleep +/-, interest OK, energy improving, concentration good, appetite improving  BSs still labile without hypoglycemia  No suicidal ideation  Patient ID: Nicholas Pandya is a 48 y o  female  HPI  The patient is a 66-year-old female who presents today for follow-up of acute adjustment disorder with depression related to marital separation  She has been compliant with the Lexapro and feels that is very helpful  She states that her daughter notes a big change in her in a positive way  Initially she had some nausea but this resolved  She has been sleeping better and her interest level is okay  Her energy is improving as well as her concentration  Her appetite is improving as well  She has had no suicidal ideation  Blood sugars are still somewhat labile though no episodes of hypoglycemia  She notes that her asthma has been somewhat worse  She has been using her albuterol by nebulization and HFA  She wonders if a steroid inhaler might be of benefit to her  She has had no significant sputum  No chest pain or shortness of breath but she has had some nocturnal awakenings, increased wheezing and periods of mild shortness of breath  The following portions of the patient's history were reviewed and updated as appropriate: allergies, current medications, past family history, past medical history, past social history, past surgical history and problem list     Review of Systems   Constitution: Negative for chills, decreased appetite, fever and malaise/fatigue  HENT: Negative for congestion  Cardiovascular: Negative for chest pain, irregular heartbeat, leg swelling, orthopnea and paroxysmal nocturnal dyspnea  Respiratory: Positive for cough and wheezing  Negative for sputum production  Endocrine: Negative for polydipsia, polyphagia and polyuria  Hematologic/Lymphatic: Negative for adenopathy and bleeding problem  Does not bruise/bleed easily  Neurological: Negative for dizziness and headaches  Psychiatric/Behavioral: Positive for depression  Negative for suicidal ideas  The patient is nervous/anxious  The patient does not have insomnia  Much improved overall         Objective:    Physical Exam   Constitutional: She is oriented to person, place, and time  She appears well-developed  HENT:   Mouth/Throat: No oropharyngeal exudate  Mucosa is moist without ulcer exudate or lesion  Eyes: Conjunctivae are normal  Right eye exhibits no discharge  Left eye exhibits no discharge  No scleral icterus  Neck: Neck supple  No JVD present  No thyromegaly present  Cardiovascular: Normal rate, regular rhythm and normal heart sounds  Pulmonary/Chest: Effort normal  No respiratory distress  She has wheezes  Some expiratory delay with end-expiratory wheezing  No crackles or rhonchi  Normal respiratory rate and no retractions  Musculoskeletal: She exhibits no edema     Lymphadenopathy:     She has no cervical adenopathy  Neurological: She is alert and oriented to person, place, and time  Psychiatric: She has a normal mood and affect  Thought content normal    Nursing note and vitals reviewed

## 2019-02-15 NOTE — ASSESSMENT & PLAN NOTE
We are going to begin her on Advair 115  She is asked to call in 1 week with report of her condition  She agrees  She will continue with her albuterol as needed

## 2019-02-26 DIAGNOSIS — J45.909 ASTHMA, UNSPECIFIED ASTHMA SEVERITY, UNSPECIFIED WHETHER COMPLICATED, UNSPECIFIED WHETHER PERSISTENT: ICD-10-CM

## 2019-02-26 RX ORDER — ALBUTEROL SULFATE 2.5 MG/3ML
2.5 SOLUTION RESPIRATORY (INHALATION) 4 TIMES DAILY
Qty: 75 ML | Refills: 0 | Status: SHIPPED | OUTPATIENT
Start: 2019-02-26 | End: 2019-03-10 | Stop reason: SDUPTHER

## 2019-03-10 DIAGNOSIS — J45.909 ASTHMA, UNSPECIFIED ASTHMA SEVERITY, UNSPECIFIED WHETHER COMPLICATED, UNSPECIFIED WHETHER PERSISTENT: ICD-10-CM

## 2019-03-10 RX ORDER — ALBUTEROL SULFATE 2.5 MG/3ML
2.5 SOLUTION RESPIRATORY (INHALATION) 4 TIMES DAILY
Qty: 75 ML | Refills: 0 | Status: SHIPPED | OUTPATIENT
Start: 2019-03-10 | End: 2019-03-11 | Stop reason: SDUPTHER

## 2019-03-11 DIAGNOSIS — J45.909 ASTHMA, UNSPECIFIED ASTHMA SEVERITY, UNSPECIFIED WHETHER COMPLICATED, UNSPECIFIED WHETHER PERSISTENT: ICD-10-CM

## 2019-03-11 RX ORDER — ALBUTEROL SULFATE 2.5 MG/3ML
2.5 SOLUTION RESPIRATORY (INHALATION) 4 TIMES DAILY
Qty: 75 ML | Refills: 2 | Status: SHIPPED | OUTPATIENT
Start: 2019-03-11 | End: 2019-03-20 | Stop reason: SDUPTHER

## 2019-03-20 ENCOUNTER — OFFICE VISIT (OUTPATIENT)
Dept: FAMILY MEDICINE CLINIC | Facility: CLINIC | Age: 54
End: 2019-03-20
Payer: COMMERCIAL

## 2019-03-20 VITALS
WEIGHT: 136 LBS | HEIGHT: 69 IN | HEART RATE: 90 BPM | SYSTOLIC BLOOD PRESSURE: 148 MMHG | BODY MASS INDEX: 20.14 KG/M2 | OXYGEN SATURATION: 97 % | DIASTOLIC BLOOD PRESSURE: 82 MMHG

## 2019-03-20 DIAGNOSIS — E10.9 TYPE 1 DIABETES MELLITUS WITHOUT COMPLICATION (HCC): Primary | ICD-10-CM

## 2019-03-20 DIAGNOSIS — F43.22 ACUTE ADJUSTMENT DISORDER WITH ANXIETY: ICD-10-CM

## 2019-03-20 DIAGNOSIS — J45.909 ASTHMA, UNSPECIFIED ASTHMA SEVERITY, UNSPECIFIED WHETHER COMPLICATED, UNSPECIFIED WHETHER PERSISTENT: ICD-10-CM

## 2019-03-20 PROCEDURE — 3008F BODY MASS INDEX DOCD: CPT | Performed by: FAMILY MEDICINE

## 2019-03-20 PROCEDURE — 99213 OFFICE O/P EST LOW 20 MIN: CPT | Performed by: FAMILY MEDICINE

## 2019-03-20 RX ORDER — ALBUTEROL SULFATE 2.5 MG/3ML
2.5 SOLUTION RESPIRATORY (INHALATION) 4 TIMES DAILY
Qty: 75 ML | Refills: 5 | Status: SHIPPED | OUTPATIENT
Start: 2019-03-20 | End: 2019-09-27 | Stop reason: SDUPTHER

## 2019-03-20 RX ORDER — ESCITALOPRAM OXALATE 10 MG/1
10 TABLET ORAL DAILY
Qty: 30 TABLET | Refills: 5 | Status: SHIPPED | OUTPATIENT
Start: 2019-03-20 | End: 2019-04-28 | Stop reason: SDUPTHER

## 2019-03-20 NOTE — PROGRESS NOTES
Assessment/Plan:  Acute adjustment disorder with anxiety  She has had a recent exacerbation of her acute adjustment disorder with anxiety  We are going to have her increase her Lexapro to 10 mg daily  She currently has no suicidal ideation  She is asked to call should her condition worsen  Otherwise were going to see her back in 4-6 weeks  She agrees  Type 1 diabetes mellitus without complication [facility])  Lab Results   Component Value Date    HGBA1C 8 5 11/12/2018    Continue follow-up with Endocrinology  No results for input(s): POCGLU in the last 72 hours  Blood Sugar Average: Last 72 hrs:      Asthma  Continue with montelukast and Advair  Refill albuterol  Call should condition worsen  Diagnoses and all orders for this visit:    Type 1 diabetes mellitus without complication (HCC)    Acute adjustment disorder with anxiety  -     escitalopram (LEXAPRO) 10 mg tablet; Take 1 tablet (10 mg total) by mouth daily    Asthma, unspecified asthma severity, unspecified whether complicated, unspecified whether persistent  -     albuterol (2 5 mg/3 mL) 0 083 % nebulizer solution; Take 1 vial (2 5 mg total) by nebulization 4 (four) times a day          Subjective:   Chief Complaint   Patient presents with    Anxiety   Everything was civil but then a postcard came that said he bought a [address]  Continues to live with   Moving to daughters on Saturday  Sleep difficult, interest OK, energy so so, concentration poor, appetite, no suicidal ideation  No SE from Lexapro 5  Patient ID: Jesús Monae is a 48 y o  female  HPI  The patient is a 30-year-old female with a history of multiple medical problems including type 1 diabetes hypothyroidism, COPD, asthma, tobacco use, chronic back pain from lumbar spinal stenosis with radiculopathy    She also has a history of cervical radiculopathy related to spondylosis of the cervical region, rheumatoid arthritis as well as recent acute adjustment disorder with anxiety related to separation probable divorce from her   She states that things seem to be going fairly well  She had been continue to live with her  though they are  and the [place] since  Her son has been moving some of her belongings to her daughter's house where she will be moving this weekend  Two days ago she found a card in the mail box addressed her  congratulated him on the per cheese of his new or leave some motorcycle  She was unaware that he had purchased 1  No money was missing from the joint back account and she questions where he had the funds to purchase this motorcycle  She accused him of hiding things from her and the situation deteriorated  She has become very agitated and irritable  She has had difficulty sleeping energy is down her concentration is been poor  She has had no suicidal ideation  She has been compliant with Lexapro 5 and she has had no side effects from it  She is not homicidal   Her asthma has been worse  She continues with her Singulair and Advair  She has been using more albuterol by nebulizer  The following portions of the patient's history were reviewed and updated as appropriate: allergies, current medications, past family history, past medical history, past social history, past surgical history and problem list     Review of Systems   Constitution: Positive for malaise/fatigue  Negative for chills, decreased appetite and night sweats  Cardiovascular: Negative for chest pain  Respiratory: Positive for cough, shortness of breath and wheezing  Negative for hemoptysis and sputum production  Skin: Negative for rash  Neurological: Negative for headaches  Psychiatric/Behavioral: Positive for depression  Negative for suicidal ideas and thoughts of violence  The patient has insomnia and is nervous/anxious  Objective:    Physical Exam   Constitutional: She is oriented to person, place, and time   She appears well-developed and well-nourished  No distress  Neck: No JVD present  Cardiovascular: Normal rate and regular rhythm  Pulmonary/Chest: Effort normal  No respiratory distress  She has wheezes  She has no rales  She has moderate expiratory wheezing  Respiratory rate is normal in the range of 16  No retractions or other evidence of increased work of breathing  Neurological: She is alert and oriented to person, place, and time  Psychiatric:   Appears anxious  Becomes tearful

## 2019-03-20 NOTE — ASSESSMENT & PLAN NOTE
Lab Results   Component Value Date    HGBA1C 8 5 11/12/2018    Continue follow-up with Endocrinology  No results for input(s): POCGLU in the last 72 hours      Blood Sugar Average: Last 72 hrs:

## 2019-03-20 NOTE — ASSESSMENT & PLAN NOTE
She has had a recent exacerbation of her acute adjustment disorder with anxiety  We are going to have her increase her Lexapro to 10 mg daily  She currently has no suicidal ideation  She is asked to call should her condition worsen  Otherwise were going to see her back in 4-6 weeks  She agrees

## 2019-04-04 ENCOUNTER — APPOINTMENT (OUTPATIENT)
Dept: RADIOLOGY | Facility: CLINIC | Age: 54
End: 2019-04-04
Payer: COMMERCIAL

## 2019-04-04 ENCOUNTER — OFFICE VISIT (OUTPATIENT)
Dept: URGENT CARE | Facility: CLINIC | Age: 54
End: 2019-04-04
Payer: COMMERCIAL

## 2019-04-04 VITALS
SYSTOLIC BLOOD PRESSURE: 155 MMHG | DIASTOLIC BLOOD PRESSURE: 63 MMHG | OXYGEN SATURATION: 96 % | HEART RATE: 93 BPM | TEMPERATURE: 98.5 F | RESPIRATION RATE: 18 BRPM

## 2019-04-04 DIAGNOSIS — S99.912A LEFT ANKLE INJURY, INITIAL ENCOUNTER: ICD-10-CM

## 2019-04-04 DIAGNOSIS — S99.912A LEFT ANKLE INJURY, INITIAL ENCOUNTER: Primary | ICD-10-CM

## 2019-04-04 PROCEDURE — 73610 X-RAY EXAM OF ANKLE: CPT

## 2019-04-04 PROCEDURE — 99213 OFFICE O/P EST LOW 20 MIN: CPT | Performed by: PHYSICIAN ASSISTANT

## 2019-04-06 DIAGNOSIS — J45.909 ASTHMA, UNSPECIFIED ASTHMA SEVERITY, UNSPECIFIED WHETHER COMPLICATED, UNSPECIFIED WHETHER PERSISTENT: ICD-10-CM

## 2019-04-24 LAB
CREAT ?TM UR-SCNC: 278.3 UMOL/L
EXT MICROALBUMIN URINE RANDOM: 38.8
HBA1C MFR BLD HPLC: 8.4 %
MICROALBUMIN/CREAT UR: 13.9 MG/G{CREAT}

## 2019-04-28 DIAGNOSIS — F43.22 ACUTE ADJUSTMENT DISORDER WITH ANXIETY: ICD-10-CM

## 2019-04-29 RX ORDER — ESCITALOPRAM OXALATE 10 MG/1
TABLET ORAL
Qty: 30 TABLET | Refills: 0 | Status: SHIPPED | OUTPATIENT
Start: 2019-04-29 | End: 2019-05-28 | Stop reason: SDUPTHER

## 2019-05-28 DIAGNOSIS — F43.22 ACUTE ADJUSTMENT DISORDER WITH ANXIETY: ICD-10-CM

## 2019-05-28 RX ORDER — ESCITALOPRAM OXALATE 10 MG/1
TABLET ORAL
Qty: 30 TABLET | Refills: 0 | Status: SHIPPED | OUTPATIENT
Start: 2019-05-28 | End: 2019-06-30 | Stop reason: SDUPTHER

## 2019-06-10 ENCOUNTER — OFFICE VISIT (OUTPATIENT)
Dept: URGENT CARE | Facility: CLINIC | Age: 54
End: 2019-06-10
Payer: COMMERCIAL

## 2019-06-10 ENCOUNTER — APPOINTMENT (OUTPATIENT)
Dept: RADIOLOGY | Facility: CLINIC | Age: 54
End: 2019-06-10
Payer: COMMERCIAL

## 2019-06-10 VITALS
OXYGEN SATURATION: 95 % | SYSTOLIC BLOOD PRESSURE: 136 MMHG | HEIGHT: 69 IN | TEMPERATURE: 98 F | RESPIRATION RATE: 20 BRPM | BODY MASS INDEX: 19.99 KG/M2 | HEART RATE: 89 BPM | WEIGHT: 135 LBS | DIASTOLIC BLOOD PRESSURE: 70 MMHG

## 2019-06-10 DIAGNOSIS — S50.12XA CONTUSION OF LEFT FOREARM, INITIAL ENCOUNTER: ICD-10-CM

## 2019-06-10 DIAGNOSIS — S49.92XA INJURY OF LEFT UPPER EXTREMITY, INITIAL ENCOUNTER: ICD-10-CM

## 2019-06-10 DIAGNOSIS — S63.502A SPRAIN OF LEFT WRIST, INITIAL ENCOUNTER: Primary | ICD-10-CM

## 2019-06-10 PROCEDURE — 73090 X-RAY EXAM OF FOREARM: CPT

## 2019-06-10 PROCEDURE — 26750 TREAT FINGER FRACTURE EACH: CPT | Performed by: PHYSICIAN ASSISTANT

## 2019-06-10 PROCEDURE — 99213 OFFICE O/P EST LOW 20 MIN: CPT | Performed by: PHYSICIAN ASSISTANT

## 2019-06-10 PROCEDURE — 73110 X-RAY EXAM OF WRIST: CPT

## 2019-06-30 DIAGNOSIS — F43.22 ACUTE ADJUSTMENT DISORDER WITH ANXIETY: ICD-10-CM

## 2019-07-01 RX ORDER — ESCITALOPRAM OXALATE 10 MG/1
TABLET ORAL
Qty: 30 TABLET | Refills: 0 | Status: SHIPPED | OUTPATIENT
Start: 2019-07-01 | End: 2019-07-30 | Stop reason: SDUPTHER

## 2019-07-15 DIAGNOSIS — J45.909 PERSISTENT ASTHMA WITHOUT COMPLICATION, UNSPECIFIED ASTHMA SEVERITY: ICD-10-CM

## 2019-07-15 RX ORDER — MONTELUKAST SODIUM 10 MG/1
TABLET ORAL
Qty: 30 TABLET | Refills: 5 | Status: SHIPPED | OUTPATIENT
Start: 2019-07-15 | End: 2020-07-06 | Stop reason: SDUPTHER

## 2019-07-30 DIAGNOSIS — F43.22 ACUTE ADJUSTMENT DISORDER WITH ANXIETY: ICD-10-CM

## 2019-07-30 RX ORDER — ESCITALOPRAM OXALATE 10 MG/1
TABLET ORAL
Qty: 30 TABLET | Refills: 0 | Status: SHIPPED | OUTPATIENT
Start: 2019-07-30 | End: 2019-08-02 | Stop reason: SDUPTHER

## 2019-08-02 DIAGNOSIS — F43.22 ACUTE ADJUSTMENT DISORDER WITH ANXIETY: ICD-10-CM

## 2019-08-02 RX ORDER — ESCITALOPRAM OXALATE 10 MG/1
10 TABLET ORAL DAILY
Qty: 30 TABLET | Refills: 1 | Status: SHIPPED | OUTPATIENT
Start: 2019-08-02 | End: 2019-11-08

## 2019-08-22 ENCOUNTER — OFFICE VISIT (OUTPATIENT)
Dept: URGENT CARE | Facility: CLINIC | Age: 54
End: 2019-08-22
Payer: COMMERCIAL

## 2019-08-22 VITALS
BODY MASS INDEX: 19.47 KG/M2 | TEMPERATURE: 99.3 F | HEIGHT: 70 IN | DIASTOLIC BLOOD PRESSURE: 72 MMHG | RESPIRATION RATE: 18 BRPM | HEART RATE: 99 BPM | OXYGEN SATURATION: 98 % | WEIGHT: 136 LBS | SYSTOLIC BLOOD PRESSURE: 158 MMHG

## 2019-08-22 DIAGNOSIS — J20.9 ACUTE BRONCHITIS, UNSPECIFIED ORGANISM: Primary | ICD-10-CM

## 2019-08-22 PROCEDURE — 99213 OFFICE O/P EST LOW 20 MIN: CPT | Performed by: PHYSICIAN ASSISTANT

## 2019-08-22 RX ORDER — PREDNISONE 20 MG/1
20 TABLET ORAL 2 TIMES DAILY WITH MEALS
Qty: 10 TABLET | Refills: 0 | Status: SHIPPED | OUTPATIENT
Start: 2019-08-22 | End: 2019-08-27

## 2019-08-22 RX ORDER — AMOXICILLIN AND CLAVULANATE POTASSIUM 875; 125 MG/1; MG/1
1 TABLET, FILM COATED ORAL EVERY 12 HOURS SCHEDULED
Qty: 20 TABLET | Refills: 0 | Status: SHIPPED | OUTPATIENT
Start: 2019-08-22 | End: 2019-09-01

## 2019-08-22 NOTE — PATIENT INSTRUCTIONS
1   Bronchitis  -Take antibiotic as directed with food  -Take prednisone as directed with food  -Use inhaler or nebulizer every 4-6 hours as needed  -Tylenol/Motrin  -Increase fluids  -Follow-up with PCP within 3-5 days    Go to ER with worsening symptoms, fever, chest pain, shortness of breath, or any signs of distress

## 2019-08-22 NOTE — PROGRESS NOTES
[facility]        NAME: Shannan Jones is a 47 y o  female  : 1965    MRN: [id number]  DATE: 2019  TIME: 6:25 PM    Assessment and Plan   Acute bronchitis, unspecified organism [J20 9]  1  Acute bronchitis, unspecified organism  amoxicillin-clavulanate (AUGMENTIN) 875-125 mg per tablet    predniSONE 20 mg tablet         Patient Instructions     1  Bronchitis  -patient declines neb in office   -Take antibiotic as directed with food  -Take prednisone as directed with food  Benefit outweighs risk  Patient is going to monitor her blood sugars closely and adjust her insulin accordingly  -Use inhaler or nebulizer every 4-6 hours as needed  -Tylenol/Motrin  -Increase fluids  -Follow-up with PCP within 3-5 days    Go to ER with worsening symptoms, fever, chest pain, shortness of breath, or any signs of distress    Chief Complaint     Chief Complaint   Patient presents with    Cold Like Symptoms     started Monday  complains of chest and nasal congestion   Cough     productive for green sputum  hx of asthma and mild COPD  History of Present Illness       The patient presents today for an evaluation of cold symptoms that started on Monday  The patient states that now her symptoms have moved down into chest  The patient has a history of asthma/COPD and she is a smoker  The patient has not tried anything at home  The patient has been using her nebulizer  Review of Systems   Review of Systems   Constitutional: Negative for chills and fever  HENT: Positive for congestion  Negative for ear pain, rhinorrhea and sore throat  Eyes: Negative for visual disturbance  Respiratory: Positive for cough and wheezing  Cardiovascular: Negative for chest pain  Musculoskeletal: Negative for arthralgias  Neurological: Negative for headaches  All other systems reviewed and are negative          Current Medications       Current Outpatient Medications:     albuterol (2 5 mg/3 mL) 0  083 % nebulizer solution, Take 1 vial (2 5 mg total) by nebulization 4 (four) times a day, Disp: 75 mL, Rfl: 5    atorvastatin (LIPITOR) 80 mg tablet, Take 80 mg by mouth daily, Disp: , Rfl: 3    Blood Glucose Calibration (OT ULTRA/FASTTK CNTRL SOLN) SOLN, by In Vitro route, Disp: , Rfl:     escitalopram (LEXAPRO) 10 mg tablet, Take 1 tablet (10 mg total) by mouth daily, Disp: 30 tablet, Rfl: 1    fluticasone-salmeterol (ADVAIR HFA) 115-21 MCG/ACT inhaler, Inhale 2 puffs 2 (two) times a day Rinse mouth after use , Disp: 1 Inhaler, Rfl: 5    gabapentin (NEURONTIN) 600 MG tablet, Take 1 tablet (600 mg total) by mouth 3 (three) times a day, Disp: 90 tablet, Rfl: 5    HUMALOG 100 UNIT/ML injection, , Disp: , Rfl:     Lancet Devices (LANCING DEVICE) MISC, by Does not apply route, Disp: , Rfl:     levothyroxine 112 mcg tablet, Take 112 mcg by mouth daily, Disp: , Rfl: 0    montelukast (SINGULAIR) 10 mg tablet, TAKE 1 TABLET BY MOUTH EVERY DAY, Disp: 30 tablet, Rfl: 5    ONE TOUCH ULTRA TEST test strip, , Disp: , Rfl:     amoxicillin-clavulanate (AUGMENTIN) 875-125 mg per tablet, Take 1 tablet by mouth every 12 (twelve) hours for 10 days, Disp: 20 tablet, Rfl: 0    cetirizine (ZyrTEC) 10 mg tablet, Take 10 mg by mouth daily  , Disp: , Rfl:     insulin aspart (NOVOLOG) 100 units/mL injection, Inject 50 Units under the skin  , Disp: , Rfl:     predniSONE 20 mg tablet, Take 1 tablet (20 mg total) by mouth 2 (two) times a day with meals for 5 days, Disp: 10 tablet, Rfl: 0    PROAIR  (90 Base) MCG/ACT inhaler, Inhale 2 puffs 4 (four) times a day As directed (Patient not taking: Reported on 8/22/2019), Disp: 1 Inhaler, Rfl: 1    PROAIR  (90 Base) MCG/ACT inhaler, INHALE 2 PUFFS 4 (FOUR) TIMES A DAY AS DIRECTED (Patient not taking: Reported on 8/22/2019), Disp: 8 5 Inhaler, Rfl: 2    Respiratory Therapy Supplies (JET-AIR REUSABLE NEBULIZER SYS) KIT, by Does not apply route, Disp: , Rfl:     Current Allergies     Allergies as of 08/22/2019 - Reviewed 08/22/2019   Allergen Reaction Noted    Celecoxib GI Bleeding 09/06/2012    Codeine GI Intolerance 08/15/2016    Motrin [ibuprofen] GI Intolerance 08/15/2016    Oxycodone  03/17/2016    Vicodin [hydrocodone-acetaminophen] GI Intolerance 08/15/2016            The following portions of the patient's history were reviewed and updated as appropriate: allergies, current medications, past family history, past medical history, past social history, past surgical history and problem list      Past Medical History:   Diagnosis Date    Abscess of pubic region 3/9/2018    Acute gouty arthritis 10/16/2015    Asthma     Diabetes mellitus ([facility] [address] )     Disease of thyroid gland     Fibromyalgia syndrome     High cholesterol     MRSA infection 3/13/2018    Neuropathic pain of both legs     PONV (postoperative nausea and vomiting)        Past Surgical History:   Procedure Laterality Date    BACK SURGERY      l5 - s1 fused    CARPAL TUNNEL RELEASE Left     HYSTERECTOMY      CA INCISE FINGER TENDON SHEATH Left 8/16/2016    Procedure: LONG TRIGGER FINGER RELEASE ;  Surgeon: Dawood Nuñez MD;  [facility];  Service: Orthopedics    CA INCISE FINGER TENDON SHEATH Right 6/9/2017    Procedure: THUMB TRIGGER RELEASE;  Surgeon: Dawood Nuñez MD;  [facility];  Service: Orthopedics    CA WRIST Chalice Pastel LIG Right 10/14/2016    Procedure: ENDOSCOPIC CARPAL TUNNEL RELEASE ;  Surgeon: Dawood Nuñez MD;  [facility];  Service: Orthopedics    CA WRIST Chalice Pastel LIG Left 8/16/2016    Procedure: ENDOSCOPIC CARPAL TUNNEL RELEASE ;  Surgeon: Dawood Nuñez MD;  [facility];  Service: Orthopedics    ULNAR NERVE TRANSPOSITION Right        Family History   Problem Relation Age of Onset    Hyperlipidemia Mother     Hypertension Mother     Heart disease Mother     Hyperlipidemia Father     Hypertension Father  Hyperlipidemia Brother     Lumbar disc disease Brother     No Known Problems Son     No Known Problems Daughter          Medications have been verified  Objective   /72 (BP Location: Left arm, Patient Position: Sitting)   Pulse 99   Temp 99 3 °F (37 4 °C) (Temporal)   Resp 18   Ht 5' 9 5" (1 765 m)   Wt 61 7 kg (136 lb)   SpO2 98%   BMI 19 80 kg/m²        Physical Exam     Physical Exam   Constitutional: She is oriented to person, place, and time  She appears well-developed and well-nourished  No distress  HENT:   Head: Normocephalic and atraumatic  Right Ear: Tympanic membrane and ear canal normal    Left Ear: Tympanic membrane and ear canal normal    Nose: Nose normal    Mouth/Throat: Uvula is midline, oropharynx is clear and moist and mucous membranes are normal    Eyes: Pupils are equal, round, and reactive to light  Conjunctivae and EOM are normal    Neck: Normal range of motion  Neck supple  Cardiovascular: Normal rate, regular rhythm and normal heart sounds  Pulmonary/Chest: Effort normal  Stridor: scattered wheezing  She has wheezes  Lymphadenopathy:     She has no cervical adenopathy  Neurological: She is alert and oriented to person, place, and time  Skin: Skin is warm and dry  Psychiatric: She has a normal mood and affect  Nursing note and vitals reviewed

## 2019-09-18 LAB — HBA1C MFR BLD HPLC: 9.5 %

## 2019-09-27 ENCOUNTER — OFFICE VISIT (OUTPATIENT)
Dept: FAMILY MEDICINE CLINIC | Facility: CLINIC | Age: 54
End: 2019-09-27
Payer: COMMERCIAL

## 2019-09-27 VITALS
DIASTOLIC BLOOD PRESSURE: 74 MMHG | BODY MASS INDEX: 20.33 KG/M2 | HEIGHT: 70 IN | OXYGEN SATURATION: 98 % | WEIGHT: 142 LBS | HEART RATE: 83 BPM | TEMPERATURE: 98.3 F | SYSTOLIC BLOOD PRESSURE: 130 MMHG

## 2019-09-27 DIAGNOSIS — Z12.11 SCREENING FOR MALIGNANT NEOPLASM OF COLON: ICD-10-CM

## 2019-09-27 DIAGNOSIS — J45.41 MODERATE PERSISTENT ASTHMA WITH ACUTE EXACERBATION: ICD-10-CM

## 2019-09-27 DIAGNOSIS — Z12.31 ENCOUNTER FOR SCREENING MAMMOGRAM FOR MALIGNANT NEOPLASM OF BREAST: Primary | ICD-10-CM

## 2019-09-27 DIAGNOSIS — J44.9 CHRONIC OBSTRUCTIVE PULMONARY DISEASE, UNSPECIFIED COPD TYPE (HCC): ICD-10-CM

## 2019-09-27 DIAGNOSIS — J45.909 ASTHMA, UNSPECIFIED ASTHMA SEVERITY, UNSPECIFIED WHETHER COMPLICATED, UNSPECIFIED WHETHER PERSISTENT: ICD-10-CM

## 2019-09-27 PROCEDURE — 99213 OFFICE O/P EST LOW 20 MIN: CPT | Performed by: FAMILY MEDICINE

## 2019-09-27 PROCEDURE — 3008F BODY MASS INDEX DOCD: CPT | Performed by: FAMILY MEDICINE

## 2019-09-27 RX ORDER — ALBUTEROL SULFATE 2.5 MG/3ML
2.5 SOLUTION RESPIRATORY (INHALATION) 4 TIMES DAILY
Qty: 75 ML | Refills: 5 | Status: CANCELLED | OUTPATIENT
Start: 2019-09-27

## 2019-09-27 RX ORDER — ALBUTEROL SULFATE 2.5 MG/3ML
2.5 SOLUTION RESPIRATORY (INHALATION) 4 TIMES DAILY
Qty: 75 ML | Refills: 5 | Status: SHIPPED | OUTPATIENT
Start: 2019-09-27 | End: 2020-03-27 | Stop reason: SDUPTHER

## 2019-09-27 NOTE — PROGRESS NOTES
Assessment/Plan:  Asthma  Patient is going to continue to use her albuterol by nebulizer  She is asked to resume her Advair which she has not been using recently  She is asked to call with report of her symptoms in 7-10 days or sooner as needed  She agrees  Chronic obstructive pulmonary disease (HCC)  Resume Advair  Quit tobacco          Diagnoses and all orders for this visit:    Encounter for screening mammogram for malignant neoplasm of breast  -     Mammo screening bilateral w cad; Future    Asthma, unspecified asthma severity, unspecified whether complicated, unspecified whether persistent  -     albuterol (2 5 mg/3 mL) 0 083 % nebulizer solution; Take 1 vial (2 5 mg total) by nebulization 4 (four) times a day    Screening for malignant neoplasm of colon  -     Occult Blood, Fecal, IA; Future  -     Occult Blood, Fecal, IA    Moderate persistent asthma with acute exacerbation  -     Respiratory Therapy Supplies (JET-AIR REUSABLE NEBULIZER SYS) KIT; by Does not apply route every 4 (four) hours as needed (wheezing)  -     fluticasone-salmeterol (ADVAIR HFA) 115-21 MCG/ACT inhaler; Inhale 2 puffs 2 (two) times a day Rinse mouth after use  Chronic obstructive pulmonary disease, unspecified COPD type ([facility] [address] )    Other orders  -     Cancel: albuterol (2 5 mg/3 mL) 0 083 % nebulizer solution; Take 1 vial (2 5 mg total) by nebulization 4 (four) times a day  -     Cancel: Respiratory Therapy Supplies (JET-AIR REUSABLE NEBULIZER SYS) KIT; by Does not apply route          Subjective:   Chief Complaint   Patient presents with    med check     here for med refills  pt needs hep c done  Asthma " horrible "  Went to UC a month ago  Steroids and antibiotics  Improved but now with increased congestion  Low grade fevers  Sputum  Nocturnal wakening's  Nebs at least 3x a night  No Advair recently  Patient ID: Favio Escamilla is a 47 y o  female      HPI  The patient is a 80-year-old female who presents today for follow-up of her asthma  She states it is horrible  It has been worse over the summer  She has been using her albuterol by nebulizer several times a day  She went to urgent care last month  She was given antibiotics and steroids and things improved significantly  Now she has more congestion in her chest   She feels like she has had low-grade fevers  She feels like she has some sputum but has not expectorated  No hemoptysis  She has nocturnal awakenings  She has not been using her Advair  She is compliant with her Singulair  The following portions of the patient's history were reviewed and updated as appropriate: allergies, current medications, past medical history, past social history, past surgical history and problem list     Review of Systems   Constitution: Negative  Cardiovascular: Negative for chest pain, irregular heartbeat, leg swelling, orthopnea and paroxysmal nocturnal dyspnea  Respiratory: Positive for cough, shortness of breath, sleep disturbances due to breathing, sputum production and wheezing  Negative for hemoptysis  Endocrine: Positive for polyuria  Type 1 diabetic, on insulin pump, followed by Endocrinology  Skin: Negative for rash  Gastrointestinal: Negative for constipation and diarrhea  Genitourinary: Negative for dysuria  Neurological: Negative for dizziness and headaches  Psychiatric/Behavioral: Negative for altered mental status, depression and suicidal ideas  The patient is nervous/anxious  The patient does not have insomnia  Objective:    Physical Exam   Constitutional: She is oriented to person, place, and time  She appears well-developed and well-nourished  No distress  Neck: Neck supple  No JVD present  No thyromegaly present  Cardiovascular: Normal rate, regular rhythm and normal heart sounds  Pulmonary/Chest: Effort normal    She has normal respiratory rate without evidence of increased work of breathing    She has some mildly increased AP diameter  She has some rhonchi in the left mid feels which clear with coughing  She has some forced expiratory wheezing  She has no crackles noted  Musculoskeletal: She exhibits no edema  Neurological: She is alert and oriented to person, place, and time  Skin: No rash noted  Psychiatric: She has a normal mood and affect  Thought content normal    Nursing note and vitals reviewed

## 2019-09-27 NOTE — ASSESSMENT & PLAN NOTE
Patient is going to continue to use her albuterol by nebulizer  She is asked to resume her Advair which she has not been using recently  She is asked to call with report of her symptoms in 7-10 days or sooner as needed  She agrees

## 2019-11-08 DIAGNOSIS — F43.22 ACUTE ADJUSTMENT DISORDER WITH ANXIETY: ICD-10-CM

## 2019-11-08 RX ORDER — ESCITALOPRAM OXALATE 10 MG/1
10 TABLET ORAL DAILY
Qty: 30 TABLET | Refills: 1 | Status: SHIPPED | OUTPATIENT
Start: 2019-11-08 | End: 2020-01-08

## 2019-11-13 ENCOUNTER — HOSPITAL ENCOUNTER (OUTPATIENT)
Dept: MAMMOGRAPHY | Facility: HOSPITAL | Age: 54
Discharge: HOME | End: 2019-11-13
Payer: COMMERCIAL

## 2019-11-13 VITALS — WEIGHT: 140 LBS | BODY MASS INDEX: 20.73 KG/M2 | HEIGHT: 69 IN

## 2019-11-13 DIAGNOSIS — Z12.31 ENCOUNTER FOR SCREENING MAMMOGRAM FOR MALIGNANT NEOPLASM OF BREAST: ICD-10-CM

## 2019-11-13 PROCEDURE — 77063 BREAST TOMOSYNTHESIS BI: CPT

## 2019-11-13 PROCEDURE — 77067 SCR MAMMO BI INCL CAD: CPT

## 2019-11-30 DIAGNOSIS — M54.2 CERVICALGIA: ICD-10-CM

## 2019-12-02 RX ORDER — GABAPENTIN 600 MG/1
TABLET ORAL
Qty: 270 TABLET | Refills: 1 | Status: SHIPPED | OUTPATIENT
Start: 2019-12-02 | End: 2020-09-05 | Stop reason: ALTCHOICE

## 2019-12-13 ENCOUNTER — HOSPITAL ENCOUNTER (OUTPATIENT)
Dept: MAMMOGRAPHY | Facility: HOSPITAL | Age: 54
Discharge: HOME | End: 2019-12-13
Payer: COMMERCIAL

## 2019-12-13 ENCOUNTER — HOSPITAL ENCOUNTER (OUTPATIENT)
Dept: ULTRASOUND IMAGING | Facility: HOSPITAL | Age: 54
Discharge: HOME | End: 2019-12-13
Payer: COMMERCIAL

## 2019-12-13 VITALS — HEIGHT: 69 IN | WEIGHT: 140 LBS | BODY MASS INDEX: 20.73 KG/M2

## 2019-12-13 DIAGNOSIS — R92.8 ABNORMAL MAMMOGRAM: ICD-10-CM

## 2019-12-13 PROCEDURE — 77065 DX MAMMO INCL CAD UNI: CPT

## 2019-12-13 PROCEDURE — 76642 ULTRASOUND BREAST LIMITED: CPT

## 2019-12-13 PROCEDURE — G0279 TOMOSYNTHESIS, MAMMO: HCPCS

## 2020-01-08 DIAGNOSIS — F43.22 ACUTE ADJUSTMENT DISORDER WITH ANXIETY: ICD-10-CM

## 2020-01-08 RX ORDER — ESCITALOPRAM OXALATE 10 MG/1
TABLET ORAL
Qty: 30 TABLET | Refills: 0 | Status: SHIPPED | OUTPATIENT
Start: 2020-01-08 | End: 2020-02-04

## 2020-02-04 DIAGNOSIS — F43.22 ACUTE ADJUSTMENT DISORDER WITH ANXIETY: ICD-10-CM

## 2020-02-04 RX ORDER — ESCITALOPRAM OXALATE 10 MG/1
TABLET ORAL
Qty: 30 TABLET | Refills: 0 | Status: SHIPPED | OUTPATIENT
Start: 2020-02-04 | End: 2020-03-02

## 2020-02-17 ENCOUNTER — APPOINTMENT (OUTPATIENT)
Dept: LAB | Facility: HOSPITAL | Age: 55
End: 2020-02-17
Payer: MEDICARE

## 2020-02-17 ENCOUNTER — TRANSCRIBE ORDERS (OUTPATIENT)
Dept: LAB | Facility: HOSPITAL | Age: 55
End: 2020-02-17

## 2020-02-17 DIAGNOSIS — E10.40 DIABETIC NEUROPATHY, TYPE I DIABETES MELLITUS (HCC): ICD-10-CM

## 2020-02-17 DIAGNOSIS — E03.9 ACQUIRED HYPOTHYROIDISM: ICD-10-CM

## 2020-02-17 DIAGNOSIS — E03.9 ACQUIRED HYPOTHYROIDISM: Primary | ICD-10-CM

## 2020-02-17 LAB
EST. AVERAGE GLUCOSE BLD GHB EST-MCNC: 197 MG/DL
HBA1C MFR BLD: 8.5 %
TSH SERPL DL<=0.05 MIU/L-ACNC: 4.22 UIU/ML (ref 0.45–5.33)

## 2020-02-17 PROCEDURE — 84443 ASSAY THYROID STIM HORMONE: CPT

## 2020-02-17 PROCEDURE — 83036 HEMOGLOBIN GLYCOSYLATED A1C: CPT

## 2020-02-17 PROCEDURE — 36415 COLL VENOUS BLD VENIPUNCTURE: CPT

## 2020-03-01 DIAGNOSIS — F43.22 ACUTE ADJUSTMENT DISORDER WITH ANXIETY: ICD-10-CM

## 2020-03-02 DIAGNOSIS — F43.22 ACUTE ADJUSTMENT DISORDER WITH ANXIETY: ICD-10-CM

## 2020-03-02 RX ORDER — ESCITALOPRAM OXALATE 10 MG/1
TABLET ORAL
Qty: 30 TABLET | Refills: 0 | Status: SHIPPED | OUTPATIENT
Start: 2020-03-02 | End: 2020-03-02

## 2020-03-02 RX ORDER — ESCITALOPRAM OXALATE 10 MG/1
TABLET ORAL
Qty: 90 TABLET | Refills: 1 | Status: SHIPPED | OUTPATIENT
Start: 2020-03-02 | End: 2020-04-24 | Stop reason: SDUPTHER

## 2020-03-27 DIAGNOSIS — J45.909 ASTHMA, UNSPECIFIED ASTHMA SEVERITY, UNSPECIFIED WHETHER COMPLICATED, UNSPECIFIED WHETHER PERSISTENT: ICD-10-CM

## 2020-03-27 RX ORDER — ALBUTEROL SULFATE 2.5 MG/3ML
2.5 SOLUTION RESPIRATORY (INHALATION) 4 TIMES DAILY
Qty: 75 ML | Refills: 2 | Status: SHIPPED | OUTPATIENT
Start: 2020-03-27 | End: 2020-04-08 | Stop reason: SDUPTHER

## 2020-04-08 DIAGNOSIS — J45.909 ASTHMA, UNSPECIFIED ASTHMA SEVERITY, UNSPECIFIED WHETHER COMPLICATED, UNSPECIFIED WHETHER PERSISTENT: ICD-10-CM

## 2020-04-08 RX ORDER — ALBUTEROL SULFATE 2.5 MG/3ML
2.5 SOLUTION RESPIRATORY (INHALATION) 4 TIMES DAILY
Qty: 75 ML | Refills: 5 | Status: SHIPPED | OUTPATIENT
Start: 2020-04-08 | End: 2021-04-09 | Stop reason: SDUPTHER

## 2020-04-24 DIAGNOSIS — F43.22 ACUTE ADJUSTMENT DISORDER WITH ANXIETY: ICD-10-CM

## 2020-04-24 RX ORDER — ESCITALOPRAM OXALATE 10 MG/1
10 TABLET ORAL DAILY
Qty: 90 TABLET | Refills: 1 | Status: SHIPPED | OUTPATIENT
Start: 2020-04-24 | End: 2020-07-06 | Stop reason: SDUPTHER

## 2020-06-01 ENCOUNTER — APPOINTMENT (OUTPATIENT)
Dept: LAB | Facility: HOSPITAL | Age: 55
End: 2020-06-01
Payer: MEDICARE

## 2020-06-01 ENCOUNTER — TRANSCRIBE ORDERS (OUTPATIENT)
Dept: LAB | Facility: HOSPITAL | Age: 55
End: 2020-06-01

## 2020-06-01 DIAGNOSIS — E03.9 HYPOTHYROIDISM, UNSPECIFIED TYPE: ICD-10-CM

## 2020-06-01 DIAGNOSIS — E78.5 HYPERLIPIDEMIA, UNSPECIFIED HYPERLIPIDEMIA TYPE: Primary | ICD-10-CM

## 2020-06-01 DIAGNOSIS — E78.5 HYPERLIPIDEMIA, UNSPECIFIED HYPERLIPIDEMIA TYPE: ICD-10-CM

## 2020-06-01 DIAGNOSIS — E04.1 NONTOXIC THYROID NODULE: ICD-10-CM

## 2020-06-01 DIAGNOSIS — E10.40 TYPE 1 DIABETES MELLITUS WITH DIABETIC NEUROPATHY (HCC): ICD-10-CM

## 2020-06-01 LAB
ALBUMIN SERPL BCP-MCNC: 4.1 G/DL (ref 3.5–5.7)
ALP SERPL-CCNC: 60 U/L (ref 40–150)
ALT SERPL W P-5'-P-CCNC: 15 U/L (ref 7–52)
AST SERPL W P-5'-P-CCNC: 14 U/L (ref 13–39)
BILIRUB DIRECT SERPL-MCNC: 0 MG/DL (ref 0–0.2)
BILIRUB SERPL-MCNC: 0.3 MG/DL (ref 0.2–1)
CHOLEST SERPL-MCNC: 205 MG/DL (ref 0–200)
CREAT UR-MCNC: 116 MG/DL
EST. AVERAGE GLUCOSE BLD GHB EST-MCNC: 220 MG/DL
HBA1C MFR BLD: 9.3 %
HDLC SERPL-MCNC: 55 MG/DL
LDLC SERPL CALC-MCNC: 120 MG/DL (ref 0–100)
MICROALBUMIN UR-MCNC: 9.5 MG/L (ref 0–20)
MICROALBUMIN/CREAT 24H UR: 8 MG/G CREATININE (ref 0–30)
NONHDLC SERPL-MCNC: 150 MG/DL
PROT SERPL-MCNC: 7 G/DL (ref 6.4–8.9)
T4 FREE SERPL-MCNC: 0.77 NG/DL (ref 0.76–1.46)
TRIGL SERPL-MCNC: 148 MG/DL (ref 44–166)
TSH SERPL DL<=0.05 MIU/L-ACNC: 17.43 UIU/ML (ref 0.45–5.33)

## 2020-06-01 PROCEDURE — 82043 UR ALBUMIN QUANTITATIVE: CPT

## 2020-06-01 PROCEDURE — 83036 HEMOGLOBIN GLYCOSYLATED A1C: CPT

## 2020-06-01 PROCEDURE — 82570 ASSAY OF URINE CREATININE: CPT

## 2020-06-01 PROCEDURE — 80076 HEPATIC FUNCTION PANEL: CPT

## 2020-06-01 PROCEDURE — 36415 COLL VENOUS BLD VENIPUNCTURE: CPT

## 2020-06-01 PROCEDURE — 84443 ASSAY THYROID STIM HORMONE: CPT

## 2020-06-01 PROCEDURE — 80061 LIPID PANEL: CPT

## 2020-06-01 PROCEDURE — 84439 ASSAY OF FREE THYROXINE: CPT

## 2020-07-06 ENCOUNTER — OFFICE VISIT (OUTPATIENT)
Dept: FAMILY MEDICINE CLINIC | Facility: CLINIC | Age: 55
End: 2020-07-06
Payer: MEDICARE

## 2020-07-06 VITALS
BODY MASS INDEX: 21.18 KG/M2 | WEIGHT: 143 LBS | HEART RATE: 91 BPM | DIASTOLIC BLOOD PRESSURE: 88 MMHG | SYSTOLIC BLOOD PRESSURE: 150 MMHG | HEIGHT: 69 IN | TEMPERATURE: 97.9 F | OXYGEN SATURATION: 97 %

## 2020-07-06 DIAGNOSIS — M06.9 RHEUMATOID ARTHRITIS INVOLVING MULTIPLE SITES, UNSPECIFIED RHEUMATOID FACTOR PRESENCE: ICD-10-CM

## 2020-07-06 DIAGNOSIS — Z00.00 MEDICARE ANNUAL WELLNESS VISIT, INITIAL: Primary | ICD-10-CM

## 2020-07-06 DIAGNOSIS — Z78.0 ASYMPTOMATIC MENOPAUSAL STATE: ICD-10-CM

## 2020-07-06 DIAGNOSIS — J44.9 CHRONIC OBSTRUCTIVE PULMONARY DISEASE, UNSPECIFIED COPD TYPE (HCC): ICD-10-CM

## 2020-07-06 DIAGNOSIS — F43.22 ACUTE ADJUSTMENT DISORDER WITH ANXIETY: ICD-10-CM

## 2020-07-06 DIAGNOSIS — J45.909 PERSISTENT ASTHMA WITHOUT COMPLICATION, UNSPECIFIED ASTHMA SEVERITY: ICD-10-CM

## 2020-07-06 DIAGNOSIS — Z91.89 AT RISK FOR BONE DENSITY LOSS: ICD-10-CM

## 2020-07-06 DIAGNOSIS — E10.9 TYPE 1 DIABETES MELLITUS WITHOUT COMPLICATION (HCC): ICD-10-CM

## 2020-07-06 DIAGNOSIS — Z11.59 NEED FOR HEPATITIS C SCREENING TEST: ICD-10-CM

## 2020-07-06 PROCEDURE — 3046F HEMOGLOBIN A1C LEVEL >9.0%: CPT | Performed by: FAMILY MEDICINE

## 2020-07-06 PROCEDURE — 3008F BODY MASS INDEX DOCD: CPT | Performed by: FAMILY MEDICINE

## 2020-07-06 PROCEDURE — G0402 INITIAL PREVENTIVE EXAM: HCPCS | Performed by: FAMILY MEDICINE

## 2020-07-06 PROCEDURE — 99213 OFFICE O/P EST LOW 20 MIN: CPT | Performed by: FAMILY MEDICINE

## 2020-07-06 PROCEDURE — 4004F PT TOBACCO SCREEN RCVD TLK: CPT | Performed by: FAMILY MEDICINE

## 2020-07-06 RX ORDER — MONTELUKAST SODIUM 10 MG/1
10 TABLET ORAL DAILY
Qty: 30 TABLET | Refills: 5 | Status: SHIPPED | OUTPATIENT
Start: 2020-07-06 | End: 2021-11-18 | Stop reason: SDUPTHER

## 2020-07-06 RX ORDER — ESCITALOPRAM OXALATE 10 MG/1
10 TABLET ORAL DAILY
Qty: 30 TABLET | Refills: 5 | Status: SHIPPED | OUTPATIENT
Start: 2020-07-06 | End: 2021-02-12 | Stop reason: SDUPTHER

## 2020-07-06 NOTE — PATIENT INSTRUCTIONS
Medicare Preventive Visit Patient Instructions  Thank you for completing your Welcome to Medicare Visit or Medicare Annual Wellness Visit today  Your next wellness visit will be due in one year (7/6/2021)  The screening/preventive services that you may require over the next 5-10 years are detailed below  Some tests may not apply to you based off risk factors and/or age  Screening tests ordered at today's visit but not completed yet may show as past due  Also, please note that scanned in results may not display below  Preventive Screenings:  Service Recommendations Previous Testing/Comments   Colorectal Cancer Screening  * Colonoscopy    * Fecal Occult Blood Test (FOBT)/Fecal Immunochemical Test (FIT)  * Fecal DNA/Cologuard Test  * Flexible Sigmoidoscopy Age: 54-65 years old   Colonoscopy: every 10 years (may be performed more frequently if at higher risk)  OR  FOBT/FIT: every 1 year  OR  Cologuard: every 3 years  OR  Sigmoidoscopy: every 5 years  Screening may be recommended earlier than age 48 if at higher risk for colorectal cancer  Also, an individualized decision between you and your healthcare provider will decide whether screening between the ages of 74-80 would be appropriate  Colonoscopy: Not on file  FOBT/FIT: Not on file  Cologuard: Not on file  Sigmoidoscopy: Not on file         Breast Cancer Screening Age: 36 years old  Frequency: every 1-2 years  Not required if history of left and right mastectomy Mammogram: 12/13/2019    Screening Current   Cervical Cancer Screening Between the ages of 21-29, pap smear recommended once every 3 years  Between the ages of 33-67, can perform pap smear with HPV co-testing every 5 years     Recommendations may differ for women with a history of total hysterectomy, cervical cancer, or abnormal pap smears in past  Pap Smear: Not on file       Hepatitis C Screening Once for adults born between 1945 and 1965  More frequently in patients at high risk for Hepatitis C Hep C Antibody: Not on file       Diabetes Screening 1-2 times per year if you're at risk for diabetes or have pre-diabetes Fasting glucose: No results in last 5 years   A1C: 9 3 %    Screening Not Indicated  History Diabetes   Cholesterol Screening Once every 5 years if you don't have a lipid disorder  May order more often based on risk factors  Lipid panel: 06/01/2020    Screening Not Indicated  History Lipid Disorder     Other Preventive Screenings Covered by Medicare:  1  Abdominal Aortic Aneurysm (AAA) Screening: covered once if your at risk  You're considered to be at risk if you have a family history of AAA  2  Lung Cancer Screening: covers low dose CT scan once per year if you meet all of the following conditions: (1) Age 50-69; (2) No signs or symptoms of lung cancer; (3) Current smoker or have quit smoking within the last 15 years; (4) You have a tobacco smoking history of at least 30 pack years (packs per day multiplied by number of years you smoked); (5) You get a written order from a healthcare provider  3  Glaucoma Screening: covered annually if you're considered high risk: (1) You have diabetes OR (2) Family history of glaucoma OR (3)  aged 48 and older OR (3)  American aged 72 and older  3  Osteoporosis Screening: covered every 2 years if you meet one of the following conditions: (1) You're estrogen deficient and at risk for osteoporosis based off medical history and other findings; (2) Have a vertebral abnormality; (3) On glucocorticoid therapy for more than 3 months; (4) Have primary hyperparathyroidism; (5) On osteoporosis medications and need to assess response to drug therapy  · Last bone density test (DXA Scan): Not on file  5  HIV Screening: covered annually if you're between the age of 12-76  Also covered annually if you are younger than 13 and older than 72 with risk factors for HIV infection   For pregnant patients, it is covered up to 3 times per pregnancy  Immunizations:  Immunization Recommendations   Influenza Vaccine Annual influenza vaccination during flu season is recommended for all persons aged >= 6 months who do not have contraindications   Pneumococcal Vaccine (Prevnar and Pneumovax)  * Prevnar = PCV13  * Pneumovax = PPSV23   Adults 25-60 years old: 1-3 doses may be recommended based on certain risk factors  Adults 72 years old: Prevnar (PCV13) vaccine recommended followed by Pneumovax (PPSV23) vaccine  If already received PPSV23 since turning 65, then PCV13 recommended at least one year after PPSV23 dose  Hepatitis B Vaccine 3 dose series if at intermediate or high risk (ex: diabetes, end stage renal disease, liver disease)   Tetanus (Td) Vaccine - COST NOT COVERED BY MEDICARE PART B Following completion of primary series, a booster dose should be given every 10 years to maintain immunity against tetanus  Td may also be given as tetanus wound prophylaxis  Tdap Vaccine - COST NOT COVERED BY MEDICARE PART B Recommended at least once for all adults  For pregnant patients, recommended with each pregnancy  Shingles Vaccine (Shingrix) - COST NOT COVERED BY MEDICARE PART B  2 shot series recommended in those aged 48 and above     Health Maintenance Due:      Topic Date Due    Hepatitis C Screening  1965    CRC Screening: Colonoscopy  1965    Cervical Cancer Screening  05/12/1986    MAMMOGRAM  12/13/2020     Immunizations Due:      Topic Date Due    DTaP,Tdap,and Td Vaccines (1 - Tdap) 05/12/1976    Influenza Vaccine  07/01/2020     Advance Directives   What are advance directives? Advance directives are legal documents that state your wishes and plans for medical care  These plans are made ahead of time in case you lose your ability to make decisions for yourself  Advance directives can apply to any medical decision, such as the treatments you want, and if you want to donate organs  What are the types of advance directives? There are many types of advance directives, and each state has rules about how to use them  You may choose a combination of any of the following:  · Living will: This is a written record of the treatment you want  You can also choose which treatments you do not want, which to limit, and which to stop at a certain time  This includes surgery, medicine, IV fluid, and tube feedings  · Durable power of  for healthcare [place] [facility]): This is a written record that states who you want to make healthcare choices for you when you are unable to make them for yourself  This person, called a proxy, is usually a family member or a friend  You may choose more than 1 proxy  · Do not resuscitate (DNR) order:  A DNR order is used in case your heart stops beating or you stop breathing  It is a request not to have certain forms of treatment, such as CPR  A DNR order may be included in other types of advance directives  · Medical directive: This covers the care that you want if you are in a coma, near death, or unable to make decisions for yourself  You can list the treatments you want for each condition  Treatment may include pain medicine, surgery, blood transfusions, dialysis, IV or tube feedings, and a ventilator (breathing machine)  · Values history: This document has questions about your views, beliefs, and how you feel and think about life  This information can help others choose the care that you would choose  Why are advance directives important? An advance directive helps you control your care  Although spoken wishes may be used, it is better to have your wishes written down  Spoken wishes can be misunderstood, or not followed  Treatments may be given even if you do not want them  An advance directive may make it easier for your family to make difficult choices about your care     Cigarette Smoking and Your Health   Risks to your health if you smoke:  Nicotine and other chemicals found in tobacco damage every cell in your body  Even if you are a light smoker, you have an increased risk for cancer, heart disease, and lung disease  If you are pregnant or have diabetes, smoking increases your risk for complications  Benefits to your health if you stop smoking:   · You decrease respiratory symptoms such as coughing, wheezing, and shortness of breath  · You reduce your risk for cancers of the lung, mouth, throat, kidney, bladder, pancreas, stomach, and cervix  If you already have cancer, you increase the benefits of chemotherapy  You also reduce your risk for cancer returning or a second cancer from developing  · You reduce your risk for heart disease, blood clots, heart attack, and stroke  · You reduce your risk for lung infections, and diseases such as pneumonia, asthma, chronic bronchitis, and emphysema  · Your circulation improves  More oxygen can be delivered to your body  If you have diabetes, you lower your risk for complications, such as kidney, artery, and eye diseases  You also lower your risk for nerve damage  Nerve damage can lead to amputations, poor vision, and blindness  · You improve your body's ability to heal and to fight infections  For more information and support to stop smoking:   · [employer]  Phone: 5- 317 - 563-2071  Web Address: www [employer]  [facility] Facilitydonny MirelesPremier Health [facility] 2018 Information is for End User's use only and may not be sold, redistributed or otherwise used for commercial purposes  All illustrations and images included in CareNotes® are the copyrighted property of [employer]  or [facility] Preventive Visit Patient Instructions  Thank you for completing your Welcome to Medicare Visit or Medicare Annual Wellness Visit today  Your next wellness visit will be due in one year (7/6/2021)  The screening/preventive services that you may require over the next 5-10 years are detailed below  Some tests may not apply to you based off risk factors and/or age  Screening tests ordered at today's visit but not completed yet may show as past due  Also, please note that scanned in results may not display below  Preventive Screenings:  Service Recommendations Previous Testing/Comments   Colorectal Cancer Screening  * Colonoscopy    * Fecal Occult Blood Test (FOBT)/Fecal Immunochemical Test (FIT)  * Fecal DNA/Cologuard Test  * Flexible Sigmoidoscopy Age: 54-65 years old   Colonoscopy: every 10 years (may be performed more frequently if at higher risk)  OR  FOBT/FIT: every 1 year  OR  Cologuard: every 3 years  OR  Sigmoidoscopy: every 5 years  Screening may be recommended earlier than age 48 if at higher risk for colorectal cancer  Also, an individualized decision between you and your healthcare provider will decide whether screening between the ages of 74-80 would be appropriate  Colonoscopy: Not on file  FOBT/FIT: Not on file  Cologuard: Not on file  Sigmoidoscopy: Not on file    Risks and Benefits Discussed     Breast Cancer Screening Age: 36 years old  Frequency: every 1-2 years  Not required if history of left and right mastectomy Mammogram: 12/13/2019    Screening Current   Cervical Cancer Screening Between the ages of 21-29, pap smear recommended once every 3 years  Between the ages of 33-67, can perform pap smear with HPV co-testing every 5 years     Recommendations may differ for women with a history of total hysterectomy, cervical cancer, or abnormal pap smears in past  Pap Smear: Not on file    Risks and Benefits Discussed   Hepatitis C Screening Once for adults born between 1945 and 1965  More frequently in patients at high risk for Hepatitis C Hep C Antibody: Not on file    Risks and Benefits Discussed   Diabetes Screening 1-2 times per year if you're at risk for diabetes or have pre-diabetes Fasting glucose: No results in last 5 years   A1C: 9 3 %    Screening Not Indicated  History Diabetes   Cholesterol Screening Once every 5 years if you don't have a lipid disorder  May order more often based on risk factors  Lipid panel: 06/01/2020    Screening Not Indicated  History Lipid Disorder     Other Preventive Screenings Covered by Medicare:  6  Abdominal Aortic Aneurysm (AAA) Screening: covered once if your at risk  You're considered to be at risk if you have a family history of AAA  7  Lung Cancer Screening: covers low dose CT scan once per year if you meet all of the following conditions: (1) Age 50-69; (2) No signs or symptoms of lung cancer; (3) Current smoker or have quit smoking within the last 15 years; (4) You have a tobacco smoking history of at least 30 pack years (packs per day multiplied by number of years you smoked); (5) You get a written order from a healthcare provider  8  Glaucoma Screening: covered annually if you're considered high risk: (1) You have diabetes OR (2) Family history of glaucoma OR (3)  aged 48 and older OR (3)  American aged 72 and older  5  Osteoporosis Screening: covered every 2 years if you meet one of the following conditions: (1) You're estrogen deficient and at risk for osteoporosis based off medical history and other findings; (2) Have a vertebral abnormality; (3) On glucocorticoid therapy for more than 3 months; (4) Have primary hyperparathyroidism; (5) On osteoporosis medications and need to assess response to drug therapy  · Last bone density test (DXA Scan): Not on file  10  HIV Screening: covered annually if you're between the age of 12-76  Also covered annually if you are younger than 13 and older than 72 with risk factors for HIV infection  For pregnant patients, it is covered up to 3 times per pregnancy      Immunizations:  Immunization Recommendations   Influenza Vaccine Annual influenza vaccination during flu season is recommended for all persons aged >= 6 months who do not have contraindications   Pneumococcal Vaccine (Prevnar and Pneumovax)  * Prevnar = PCV13  * Pneumovax = PPSV23   Adults 25-60 years old: 1-3 doses may be recommended based on certain risk factors  Adults 72 years old: Prevnar (PCV13) vaccine recommended followed by Pneumovax (PPSV23) vaccine  If already received PPSV23 since turning 65, then PCV13 recommended at least one year after PPSV23 dose  Hepatitis B Vaccine 3 dose series if at intermediate or high risk (ex: diabetes, end stage renal disease, liver disease)   Tetanus (Td) Vaccine - COST NOT COVERED BY MEDICARE PART B Following completion of primary series, a booster dose should be given every 10 years to maintain immunity against tetanus  Td may also be given as tetanus wound prophylaxis  Tdap Vaccine - COST NOT COVERED BY MEDICARE PART B Recommended at least once for all adults  For pregnant patients, recommended with each pregnancy  Shingles Vaccine (Shingrix) - COST NOT COVERED BY MEDICARE PART B  2 shot series recommended in those aged 48 and above     Health Maintenance Due:      Topic Date Due    Hepatitis C Screening  1965    CRC Screening: Colonoscopy  1965    Cervical Cancer Screening  05/12/1986    MAMMOGRAM  12/13/2020     Immunizations Due:      Topic Date Due    DTaP,Tdap,and Td Vaccines (1 - Tdap) 05/12/1976    Influenza Vaccine  07/01/2020     Advance Directives   What are advance directives? Advance directives are legal documents that state your wishes and plans for medical care  These plans are made ahead of time in case you lose your ability to make decisions for yourself  Advance directives can apply to any medical decision, such as the treatments you want, and if you want to donate organs  What are the types of advance directives? There are many types of advance directives, and each state has rules about how to use them  You may choose a combination of any of the following:  · Living will: This is a written record of the treatment you want   You can also choose which treatments you do not want, which to limit, and which to stop at a certain time  This includes surgery, medicine, IV fluid, and tube feedings  · Durable power of  for healthcare [place] [facility]): This is a written record that states who you want to make healthcare choices for you when you are unable to make them for yourself  This person, called a proxy, is usually a family member or a friend  You may choose more than 1 proxy  · Do not resuscitate (DNR) order:  A DNR order is used in case your heart stops beating or you stop breathing  It is a request not to have certain forms of treatment, such as CPR  A DNR order may be included in other types of advance directives  · Medical directive: This covers the care that you want if you are in a coma, near death, or unable to make decisions for yourself  You can list the treatments you want for each condition  Treatment may include pain medicine, surgery, blood transfusions, dialysis, IV or tube feedings, and a ventilator (breathing machine)  · Values history: This document has questions about your views, beliefs, and how you feel and think about life  This information can help others choose the care that you would choose  Why are advance directives important? An advance directive helps you control your care  Although spoken wishes may be used, it is better to have your wishes written down  Spoken wishes can be misunderstood, or not followed  Treatments may be given even if you do not want them  An advance directive may make it easier for your family to make difficult choices about your care  Cigarette Smoking and Your Health   Risks to your health if you smoke:  Nicotine and other chemicals found in tobacco damage every cell in your body  Even if you are a light smoker, you have an increased risk for cancer, heart disease, and lung disease  If you are pregnant or have diabetes, smoking increases your risk for complications     Benefits to your health if you stop smoking:   · You decrease respiratory symptoms such as coughing, wheezing, and shortness of breath  · You reduce your risk for cancers of the lung, mouth, throat, kidney, bladder, pancreas, stomach, and cervix  If you already have cancer, you increase the benefits of chemotherapy  You also reduce your risk for cancer returning or a second cancer from developing  · You reduce your risk for heart disease, blood clots, heart attack, and stroke  · You reduce your risk for lung infections, and diseases such as pneumonia, asthma, chronic bronchitis, and emphysema  · Your circulation improves  More oxygen can be delivered to your body  If you have diabetes, you lower your risk for complications, such as kidney, artery, and eye diseases  You also lower your risk for nerve damage  Nerve damage can lead to amputations, poor vision, and blindness  · You improve your body's ability to heal and to fight infections  For more information and support to stop smoking:   · Smokefree  gov  Phone: 0- 072 - 832-3325  Web Address: [employer]  [facility] Mirelesdimitry 2018 Information is for End User's use only and may not be sold, redistributed or otherwise used for commercial purposes   All illustrations and images included in CareNotes® are the copyrighted property of A D A [employer]  or [address] [place] [address]

## 2020-07-06 NOTE — PROGRESS NOTES
Assessment/Plan:  Acute adjustment disorder with anxiety  She continues with her Lexapro which is affective  Asthma  Continue with montelukast     Allergic rhinitis  Continue with montelukast     Type 1 diabetes mellitus without complication (HCC)    Lab Results   Component Value Date    HGBA1C 9 3 (H) 06/01/2020   Continue follow-up with endocrinology  Rheumatoid arthritis ([facility] )  She no longer follows with Rheumatology  Offered to refer her to another but she declines presently  Diagnoses and all orders for this visit:    Acute adjustment disorder with anxiety  -     escitalopram (LEXAPRO) 10 mg tablet; Take 1 tablet (10 mg total) by mouth daily    Rheumatoid arthritis involving multiple sites, unspecified rheumatoid factor presence (HCC)    Persistent asthma without complication, unspecified asthma severity  -     montelukast (SINGULAIR) 10 mg tablet; Take 1 tablet (10 mg total) by mouth daily    Need for hepatitis C screening test  -     Hepatitis C antibody; Future    At risk for bone density loss    Asymptomatic menopausal state     Chronic obstructive pulmonary disease, unspecified COPD type ([facility] )    Type 1 diabetes mellitus without complication ([facility] )          Subjective:   Chief Complaint   Patient presents with   [facility] Wellness Visit     pt here for initial medicare wellness     med check     pt here for anxiety med check, pt advised to see eye dr and gyn  Patient ID: Sinan Ram is a 54 y o  female  Using albuterol nebs TID  HPI  The patient is a 68-year-old female with multiple medical problems including type 1 diabetes, chronic lymphocytic thyroiditis leading to hypothyroidism, asthma, allergies, COPD, rheumatoid arthritis in addition to anxiety/adjustment disorder as well as other  She is here for her Medicare initial wellness check  Additionally she needs refills of her Lexapro for her anxiety/depression as well as Singulair for her allergies and asthma    She states her asthma has been worse with a humidity  She has been using her albuterol up to 3 times a day  She has no maintenance inhaler and does not desire 1 presently  She believes that the montelukast works fairly well and she uses the albuterol as needed  She is not having any nocturnal awakenings presently  She does use her albuterol before bed  Allergies have been fairly well controlled  Her anxiety level is relatively well controlled with Lexapro  She is living with her daughter now will be soon living with her son  She loves being around a grandchildren on a daily basis and this is really helpful for her  The following portions of the patient's history were reviewed and updated as appropriate: allergies, current medications, past family history, past medical history, past social history, past surgical history and problem list     Review of Systems   Constitution: Negative  Cardiovascular: Negative for irregular heartbeat, leg swelling, orthopnea and paroxysmal nocturnal dyspnea  Respiratory: Positive for cough, shortness of breath and wheezing  Negative for hemoptysis and sputum production  Endocrine: Negative for polydipsia, polyphagia and polyuria  Hematologic/Lymphatic: Negative for adenopathy  Skin: Negative for dry skin and rash  Musculoskeletal: Positive for arthritis, back pain, joint pain, joint swelling, myalgias and stiffness  Gastrointestinal: Negative for constipation and diarrhea  Neurological: Negative for headaches  Psychiatric/Behavioral: Negative for suicidal ideas  The patient is nervous/anxious  The patient does not have insomnia  Objective:    Physical Exam   Constitutional: She is oriented to person, place, and time  She appears well-developed  Thin middle-aged female in no acute distress   Neck: No JVD present  She has enlarged somewhat irregular thyroid    She just had an ultrasound and follows with her endocrinologist for this   Cardiovascular: Normal rate and regular rhythm  Pulmonary/Chest: Effort normal    Mildly distant breath sounds without crackle rhonchi or wheeze  Normal respiratory rate  Musculoskeletal: She exhibits deformity  She exhibits no edema  She has small joint deformities consistent with rheumatoid arthritis   Lymphadenopathy:     She has no cervical adenopathy  Neurological: She is alert and oriented to person, place, and time  Psychiatric: She has a normal mood and affect   Judgment and thought content normal

## 2020-07-06 NOTE — ASSESSMENT & PLAN NOTE
She no longer follows with Rheumatology  Offered to refer her to another but she declines presently

## 2020-07-06 NOTE — PROGRESS NOTES
Assessment and Plan:     Problem List Items Addressed This Visit        Endocrine    Type 1 diabetes mellitus without complication ([facility] )       Lab Results   Component Value Date    HGBA1C 9 3 (H) 06/01/2020   Continue follow-up with endocrinology  Respiratory    Chronic obstructive pulmonary disease (HCC)    Relevant Medications    montelukast (SINGULAIR) 10 mg tablet    Asthma     Continue with montelukast          Relevant Medications    montelukast (SINGULAIR) 10 mg tablet       Musculoskeletal and Integument    Rheumatoid arthritis ([facility] )     She no longer follows with Rheumatology  Offered to refer her to another but she declines presently  Other    Medicare annual wellness visit, initial - Primary     Patient presents today for Medicare initial wellness visit  All components examination were addressed in completed  Multiple gaps in care were noted  She declines colonoscopy  She did agree to a Cologuard which will be ordered for her  She was advised that she needs Pap smear and she should arrange this  She needs diabetic eye exam          Acute adjustment disorder with anxiety     She continues with her Lexapro which is affective  Relevant Medications    escitalopram (LEXAPRO) 10 mg tablet      Other Visit Diagnoses     Need for hepatitis C screening test        Relevant Orders    Hepatitis C antibody    At risk for bone density loss        Asymptomatic menopausal state               Tobacco Cessation Counseling: Tobacco cessation counseling was provided  The patient is sincerely urged to quit consumption of tobacco  She is not ready to quit tobacco  Medication options not discussed  Preventive health issues were discussed with patient, and age appropriate screening tests were ordered as noted in patient's After Visit Summary    Personalized health advice and appropriate referrals for health education or preventive services given if needed, as noted in patient's After Visit Summary       History of Present Illness:     Patient presents for Medicare Annual Wellness visit    Patient Care Team:  Jake Prakash MD as PCP - General  ANDERSON Lawrence MD Eugenie Marshal, MD Dauna Sequin, MD Nathaniel Garfinkel, MD Vicci Semen, MD     Problem List:     Patient Active Problem List   Diagnosis    Allergic rhinitis    Asthma    Carpal tunnel syndrome    Cervical disc herniation    Cervical radiculopathy    Cervical spinal stenosis    Chronic obstructive pulmonary disease ([address] )    De Quervain's tenosynovitis    Degenerative disc disease, cervical    Sciatica    Muscle pain, myofacial    Pre-ulcerative corn or callous    Rheumatoid arthritis ([address] )    Spondylosis of cervical region without myelopathy or radiculopathy    Tenosynovitis of finger    Tinea pedis    Trigger finger of right thumb    Trigger middle finger of left hand    Type 1 diabetes mellitus without complication ([address] )    Ulnar neuropathy at elbow of right upper extremity    Ulnar neuropathy at elbow, left    Chronic lymphocytic thyroiditis    Encounter for long-term (current) use of insulin ([address] )    Type 1 diabetes mellitus ([address] )    Hypothyroidism    Lumbar radiculitis    Acute adjustment disorder with anxiety    Medicare annual wellness visit, initial      Past Medical and Surgical History:     Past Medical History:   Diagnosis Date    Abscess of pubic region 3/9/2018    Acute gouty arthritis 10/16/2015    Asthma     Diabetes mellitus ([address] )     Disease of thyroid gland     Fibromyalgia syndrome     High cholesterol     MRSA infection 3/13/2018    Neuropathic pain of both legs     PONV (postoperative nausea and vomiting)      Past Surgical History:   Procedure Laterality Date    BACK SURGERY      l5 - s1 fused    BREAST BIOPSY Left     11yrs ago   benign    CARPAL TUNNEL RELEASE Left     HYSTERECTOMY      RI INCISE FINGER TENDON SHEATH Left 8/16/2016    Procedure: LONG TRIGGER FINGER RELEASE ;  Surgeon: Musa Mendes MD;  [facility];  Service: Orthopedics    CO INCISE FINGER TENDON SHEATH Right 6/9/2017    Procedure: THUMB TRIGGER RELEASE;  Surgeon: Musa Mendes MD;  [facility];  Service: Orthopedics    CO WRIST Jerene Yusuf LIG Right 10/14/2016    Procedure: ENDOSCOPIC CARPAL TUNNEL RELEASE ;  Surgeon: Msua Mendes MD;  [facility];  Service: Orthopedics    CO WRIST Jerene Yusuf LIG Left 8/16/2016    Procedure: ENDOSCOPIC CARPAL TUNNEL RELEASE ;  Surgeon: Musa Mendes MD;  [facility];  Service: Orthopedics    ULNAR NERVE TRANSPOSITION Right       Family History:     Family History   Problem Relation Age of Onset   Dale Lemme Hyperlipidemia Mother     Hypertension Mother     Heart disease Mother     Hyperlipidemia Father     Hypertension Father     Hyperlipidemia Brother     Lumbar disc disease Brother     No Known Problems Son     No Known Problems Daughter     No Known Problems Maternal Grandmother     No Known Problems Maternal Grandfather     No Known Problems Paternal Grandmother     No Known Problems Paternal Grandfather     No Known Problems Maternal Aunt     No Known Problems Paternal Aunt       Social History:        Social History     Socioeconomic History    Marital status:      Spouse name: None    Number of children: None    Years of education: None    Highest education level: None   Occupational History    None   Social Needs    Financial resource strain: None    Food insecurity:     Worry: None     Inability: None    Transportation needs:     Medical: None     Non-medical: None   Tobacco Use    Smoking status: Current Every Day Smoker     Types: Cigarettes    Smokeless tobacco: Never Used    Tobacco comment: 5 cigarettes/day   Substance and Sexual Activity    Alcohol use: Yes     Frequency: Monthly or less    Drug use: No    Sexual activity: None   Lifestyle    Physical activity:     Days per week: None     Minutes per session: None    Stress: None   Relationships    Social connections:     Talks on phone: None     Gets together: None     Attends Mandaeism service: None     Active member of club or organization: None     Attends meetings of clubs or organizations: None     Relationship status: None    Intimate partner violence:     Fear of current or ex partner: None     Emotionally abused: None     Physically abused: None     Forced sexual activity: None   Other Topics Concern    None   Social History Narrative    None      Medications and Allergies:     Current Outpatient Medications   Medication Sig Dispense Refill    albuterol (2 5 mg/3 mL) 0 083 % nebulizer solution Take 1 vial (2 5 mg total) by nebulization 4 (four) times a day 75 mL 5    atorvastatin (LIPITOR) 80 mg tablet Take 80 mg by mouth daily  3    Blood Glucose Calibration (OT ULTRA/FASTTK CNTRL SOLN) SOLN by In Vitro route      cetirizine (ZyrTEC) 10 mg tablet Take 10 mg by mouth daily   escitalopram (LEXAPRO) 10 mg tablet Take 1 tablet (10 mg total) by mouth daily 30 tablet 5    HUMALOG 100 UNIT/ML injection       levothyroxine 112 mcg tablet Take 112 mcg by mouth daily  0    montelukast (SINGULAIR) 10 mg tablet Take 1 tablet (10 mg total) by mouth daily 30 tablet 5    PROAIR  (90 Base) MCG/ACT inhaler Inhale 2 puffs 4 (four) times a day As directed 1 Inhaler 1    Respiratory Therapy Supplies (JET-AIR REUSABLE NEBULIZER SYS) KIT by Does not apply route every 4 (four) hours as needed (wheezing) 1 each 1    gabapentin (NEURONTIN) 600 MG tablet TAKE 1 TABLET BY MOUTH THREE TIMES A DAY (Patient not taking: Reported on 7/6/2020) 270 tablet 1     No current facility-administered medications for this visit        Allergies   Allergen Reactions    Celecoxib GI Bleeding    Codeine GI Intolerance    Motrin [Ibuprofen] GI Intolerance    Oxycodone      Other reaction(s): GI Upset    Vicodin [Hydrocodone-Acetaminophen] GI Intolerance      Immunizations:     Immunization History   Administered Date(s) Administered    H1N1, All Formulations 02/02/2010    INFLUENZA 12/30/2014, 12/01/2016, 12/14/2017, 01/16/2019    Influenza TIV (IM) 12/01/2016    Pneumococcal Polysaccharide PPV23 12/13/2001      Health Maintenance:         Topic Date Due    Hepatitis C Screening  1965    CRC Screening: Colonoscopy  1965    Cervical Cancer Screening  05/12/1986    MAMMOGRAM  12/13/2020         Topic Date Due    DTaP,Tdap,and Td Vaccines (1 - Tdap) 05/12/1976    Influenza Vaccine  07/01/2020      Medicare Health Risk Assessment:     /88   Pulse 91   Temp 97 9 °F (36 6 °C)   Ht 5' 9" (1 753 m)   Wt 64 9 kg (143 lb)   SpO2 97%   BMI 21 12 kg/m²      Elmer Matson is here for her Initial Wellness visit  Health Risk Assessment:   Patient rates overall health as good  Patient feels that their physical health rating is much worse  Eyesight was rated as same  Hearing was rated as same  Patient feels that their emotional and mental health rating is slightly better  Pain experienced in the last 7 days has been a lot  Patient's pain rating has been 9/10  Multiple joint c/o  Has not seen Dr Awais Aguilera due to personality issues  Motrin somewhat effective but GI se      Depression Screening:   PHQ-2 Score: 0      Fall Risk Screening: In the past year, patient has experienced: no history of falling in past year      Urinary Incontinence Screening:   Patient has not leaked urine accidently in the last six months  Home Safety:  Patient has trouble with stairs inside or outside of their home  Patient has working smoke alarms and has working carbon monoxide detector  Home safety hazards include: none  Nutrition:   Current diet is Diabetic and Limited junk food  Medications:   Patient is currently taking over-the-counter supplements   OTC medications include: see medication list  Patient is able to manage medications  Activities of Daily Living (ADLs)/Instrumental Activities of Daily Living (IADLs):   Walk and transfer into and out of bed and chair?: Yes  Dress and groom yourself?: Yes    Bathe or shower yourself?: Yes    Feed yourself? Yes  Do your laundry/housekeeping?: No  Manage your money, pay your bills and track your expenses?: Yes  Make your own meals?: Yes    Do your own shopping?: No    Durable Medical Equipment Suppliers  Diabetic Supplies  [employer]     Previous Hospitalizations:   Any hospitalizations or ED visits within the last 12 months?: No      Advance Care Planning:   Living will: No    Durable POA for healthcare: No    Advanced directive: No      Comments: None presently       Cognitive Screening:   Provider or family/friend/caregiver concerned regarding cognition?: No    PREVENTIVE SCREENINGS      Cardiovascular Screening:    General: Screening Not Indicated and History Lipid Disorder      Diabetes Screening:     General: Screening Not Indicated and History Diabetes      Colorectal Cancer Screening:     General: Risks and Benefits Discussed    Due for: Cologuard      Breast Cancer Screening:     General: Screening Current      Cervical Cancer Screening:    General: Risks and Benefits Discussed    Due for: Cervical Pap Smear      Osteoporosis Screening:    General: Risks and Benefits Discussed    Due for: DXA Axial      Abdominal Aortic Aneurysm (AAA) Screening:        General: Screening Not Indicated      Lung Cancer Screening:     General: Screening Not Indicated      Hepatitis C Screening:    General: Risks and Benefits Discussed    Hep C Screening Accepted: Yes        Prerna Murray MD

## 2020-07-06 NOTE — ASSESSMENT & PLAN NOTE
Patient presents today for Medicare initial wellness visit  All components examination were addressed in completed  Multiple gaps in care were noted  She declines colonoscopy  She did agree to a Cologuard which will be ordered for her  She was advised that she needs Pap smear and she should arrange this    She needs diabetic eye exam

## 2020-07-06 NOTE — ASSESSMENT & PLAN NOTE
Lab Results   Component Value Date    HGBA1C 9 3 (H) 06/01/2020   Continue follow-up with endocrinology

## 2020-07-24 ENCOUNTER — TRANSCRIBE ORDERS (OUTPATIENT)
Dept: LAB | Facility: HOSPITAL | Age: 55
End: 2020-07-24

## 2020-07-24 ENCOUNTER — APPOINTMENT (OUTPATIENT)
Dept: LAB | Facility: HOSPITAL | Age: 55
End: 2020-07-24
Payer: MEDICARE

## 2020-07-24 DIAGNOSIS — E03.9 HYPOTHYROIDISM, UNSPECIFIED TYPE: ICD-10-CM

## 2020-07-24 DIAGNOSIS — E78.5 HYPERLIPIDEMIA, UNSPECIFIED HYPERLIPIDEMIA TYPE: ICD-10-CM

## 2020-07-24 DIAGNOSIS — Z11.59 NEED FOR HEPATITIS C SCREENING TEST: ICD-10-CM

## 2020-07-24 DIAGNOSIS — E03.9 HYPOTHYROIDISM, UNSPECIFIED TYPE: Primary | ICD-10-CM

## 2020-07-24 LAB
ALBUMIN SERPL BCP-MCNC: 4.1 G/DL (ref 3.5–5.7)
ALP SERPL-CCNC: 52 U/L (ref 40–150)
ALT SERPL W P-5'-P-CCNC: 15 U/L (ref 7–52)
AST SERPL W P-5'-P-CCNC: 14 U/L (ref 13–39)
BILIRUB DIRECT SERPL-MCNC: 0.1 MG/DL (ref 0–0.2)
BILIRUB SERPL-MCNC: 0.4 MG/DL (ref 0.2–1)
CHOLEST SERPL-MCNC: 160 MG/DL (ref 0–200)
HCV AB SER QL: NORMAL
HDLC SERPL-MCNC: 55 MG/DL
LDLC SERPL CALC-MCNC: 103 MG/DL (ref 0–100)
NONHDLC SERPL-MCNC: 105 MG/DL
PROT SERPL-MCNC: 6.7 G/DL (ref 6.4–8.9)
TRIGL SERPL-MCNC: 12 MG/DL (ref 44–166)
TSH SERPL DL<=0.05 MIU/L-ACNC: 1.83 UIU/ML (ref 0.45–5.33)

## 2020-07-24 PROCEDURE — 84443 ASSAY THYROID STIM HORMONE: CPT

## 2020-07-24 PROCEDURE — 86803 HEPATITIS C AB TEST: CPT

## 2020-07-24 PROCEDURE — 80076 HEPATIC FUNCTION PANEL: CPT

## 2020-07-24 PROCEDURE — 80061 LIPID PANEL: CPT

## 2020-07-24 PROCEDURE — 36415 COLL VENOUS BLD VENIPUNCTURE: CPT

## 2020-09-05 ENCOUNTER — APPOINTMENT (EMERGENCY)
Dept: RADIOLOGY | Facility: HOSPITAL | Age: 55
End: 2020-09-05
Payer: MEDICARE

## 2020-09-05 ENCOUNTER — HOSPITAL ENCOUNTER (EMERGENCY)
Facility: HOSPITAL | Age: 55
Discharge: HOME | End: 2020-09-05
Attending: EMERGENCY MEDICINE | Admitting: EMERGENCY MEDICINE
Payer: MEDICARE

## 2020-09-05 VITALS
WEIGHT: 140 LBS | SYSTOLIC BLOOD PRESSURE: 180 MMHG | BODY MASS INDEX: 20.67 KG/M2 | TEMPERATURE: 98.3 F | DIASTOLIC BLOOD PRESSURE: 78 MMHG | RESPIRATION RATE: 16 BRPM | HEART RATE: 80 BPM | OXYGEN SATURATION: 97 %

## 2020-09-05 DIAGNOSIS — S62.102A CLOSED FRACTURE OF LEFT WRIST, INITIAL ENCOUNTER: Primary | ICD-10-CM

## 2020-09-05 PROCEDURE — 73110 X-RAY EXAM OF WRIST: CPT

## 2020-09-05 PROCEDURE — 99283 EMERGENCY DEPT VISIT LOW MDM: CPT

## 2020-09-05 PROCEDURE — 99284 EMERGENCY DEPT VISIT MOD MDM: CPT | Performed by: EMERGENCY MEDICINE

## 2020-09-05 PROCEDURE — 29126 APPL SHORT ARM SPLINT DYN: CPT | Performed by: EMERGENCY MEDICINE

## 2020-09-05 PROCEDURE — 64450 NJX AA&/STRD OTHER PN/BRANCH: CPT | Performed by: EMERGENCY MEDICINE

## 2020-09-05 RX ORDER — LIDOCAINE HYDROCHLORIDE 10 MG/ML
10 INJECTION, SOLUTION EPIDURAL; INFILTRATION; INTRACAUDAL; PERINEURAL ONCE
Status: COMPLETED | OUTPATIENT
Start: 2020-09-05 | End: 2020-09-05

## 2020-09-05 RX ADMIN — LIDOCAINE HYDROCHLORIDE 10 ML: 10 INJECTION, SOLUTION EPIDURAL; INFILTRATION; INTRACAUDAL; PERINEURAL at 19:03
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
## 2020-09-05 NOTE — DISCHARGE INSTRUCTIONS
Wear sling until seen by Orthopedics next week  Motrin or Tylenol as needed for pain, as well as ice packs to the wrist area 4 to 6 times a day for 10-15 minutes of the time  Return to the ER for any new or worsening conditions such as numbness or pale skin  Call Dr Sarah Beth Manzano for evaluation next week

## 2020-09-08 ENCOUNTER — APPOINTMENT (OUTPATIENT)
Dept: RADIOLOGY | Facility: CLINIC | Age: 55
End: 2020-09-08
Payer: MEDICARE

## 2020-09-08 ENCOUNTER — OFFICE VISIT (OUTPATIENT)
Dept: OBGYN CLINIC | Facility: CLINIC | Age: 55
End: 2020-09-08
Payer: MEDICARE

## 2020-09-08 VITALS
BODY MASS INDEX: 20.88 KG/M2 | DIASTOLIC BLOOD PRESSURE: 80 MMHG | SYSTOLIC BLOOD PRESSURE: 198 MMHG | TEMPERATURE: 98.3 F | HEIGHT: 69 IN | WEIGHT: 141 LBS

## 2020-09-08 DIAGNOSIS — S62.102A CLOSED FRACTURE OF LEFT WRIST, INITIAL ENCOUNTER: Primary | ICD-10-CM

## 2020-09-08 DIAGNOSIS — M25.532 PAIN IN LEFT WRIST: ICD-10-CM

## 2020-09-08 PROCEDURE — 25605 CLTX DST RDL FX/EPHYS SEP W/: CPT | Performed by: ORTHOPAEDIC SURGERY

## 2020-09-08 PROCEDURE — 73110 X-RAY EXAM OF WRIST: CPT

## 2020-09-08 PROCEDURE — 99203 OFFICE O/P NEW LOW 30 MIN: CPT | Performed by: ORTHOPAEDIC SURGERY
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
## 2020-09-15 ENCOUNTER — APPOINTMENT (OUTPATIENT)
Dept: RADIOLOGY | Facility: CLINIC | Age: 55
End: 2020-09-15
Payer: MEDICARE

## 2020-09-15 ENCOUNTER — OFFICE VISIT (OUTPATIENT)
Dept: OBGYN CLINIC | Facility: CLINIC | Age: 55
End: 2020-09-15
Payer: MEDICARE

## 2020-09-15 VITALS — HEIGHT: 69 IN | TEMPERATURE: 98.4 F | WEIGHT: 139 LBS | BODY MASS INDEX: 20.59 KG/M2

## 2020-09-15 DIAGNOSIS — S62.102D CLOSED FRACTURE OF LEFT WRIST WITH ROUTINE HEALING, SUBSEQUENT ENCOUNTER: Primary | ICD-10-CM

## 2020-09-15 DIAGNOSIS — S62.102D CLOSED FRACTURE OF LEFT WRIST WITH ROUTINE HEALING, SUBSEQUENT ENCOUNTER: ICD-10-CM

## 2020-09-15 PROCEDURE — 29075 APPL CST ELBW FNGR SHORT ARM: CPT | Performed by: ORTHOPAEDIC SURGERY

## 2020-09-15 PROCEDURE — 73110 X-RAY EXAM OF WRIST: CPT

## 2020-09-15 PROCEDURE — 99024 POSTOP FOLLOW-UP VISIT: CPT | Performed by: ORTHOPAEDIC SURGERY

## 2020-09-15 NOTE — PROGRESS NOTES
Assessment:     1  Closed fracture of left wrist with routine healing, subsequent encounter          Plan:     Problem List Items Addressed This Visit        Musculoskeletal and Integument    Closed fracture of left wrist - Primary    Relevant Orders    XR wrist 3+ vw left    Cast application       54 y o  female with a left distal radius and ulnar styloid fracture, DOI 09/05/2020  Left wrist x-ray's were obtained in the splint today  X-ray's demonstrate that the fracture is stable and has not displaced further  Conchis Stover would like to continue with non operative treatment, with the understanding she may have limited wrist motion once her fracture is healed  She was placed into a short arm cast  Cast care instructions were discussed  I will see her back in 4 weeks time for cast removal and repeat left wrist x-rays  Patient ID: Bambi Hannah is a 54 y o  female  Chief Complaint:  Left wrist fracture follow up     HPI:  54 y o  female presents to the office today for a follow up regarding a left wrist fracture  Keenan fracture is being treated non operatively  She was placed into a plaster short arm splint at her visit last week  She has been tolerated the splint well  She does note pain to her left wrist  She did stop taking Motrin as is caused stomach upset         Allergy:  Allergies   Allergen Reactions    Celecoxib GI Bleeding    Codeine GI Intolerance    Motrin [Ibuprofen] GI Intolerance    Oxycodone      Other reaction(s): GI Upset    Vicodin [Hydrocodone-Acetaminophen] GI Intolerance       Medications:  all current active meds have been reviewed    Past Medical History:  Past Medical History:   Diagnosis Date    Abscess of pubic region 3/9/2018    Acute gouty arthritis 10/16/2015    Asthma     Diabetes mellitus ([facility] [address] [facility]     Disease of thyroid gland     Fibromyalgia syndrome     High cholesterol     MRSA infection 3/13/2018    Neuropathic pain of both legs     PONV (postoperative nausea and vomiting)        Past Surgical History:  Past Surgical History:   Procedure Laterality Date    BACK SURGERY      l5 - s1 fused    BREAST BIOPSY Left     11yrs ago   benign    CARPAL TUNNEL RELEASE Left     HYSTERECTOMY      OH INCISE FINGER TENDON SHEATH Left 8/16/2016    Procedure: LONG TRIGGER FINGER RELEASE ;  Surgeon: Criss Copeland MD;  [facility];  Service: Orthopedics    OH INCISE FINGER TENDON SHEATH Right 6/9/2017    Procedure: THUMB TRIGGER RELEASE;  Surgeon: Criss Copeland MD;  [facility];  Service: Orthopedics    OH WRIST Volney Saltness LIG Right 10/14/2016    Procedure: ENDOSCOPIC CARPAL TUNNEL RELEASE ;  Surgeon: Criss Copeland MD;  [facility];  Service: Orthopedics    OH WRIST Volney Saltness LIG Left 8/16/2016    Procedure: ENDOSCOPIC CARPAL TUNNEL RELEASE ;  Surgeon: Criss Copeland MD;  [facility];  Service: Orthopedics    ULNAR NERVE TRANSPOSITION Right        Family History:  Family History   Problem Relation Age of Onset    Hyperlipidemia Mother     Hypertension Mother     Heart disease Mother     Hyperlipidemia Father     Hypertension Father     Hyperlipidemia Brother     Lumbar disc disease Brother     No Known Problems Son     No Known Problems Daughter     No Known Problems Maternal Grandmother     No Known Problems Maternal Grandfather     No Known Problems Paternal Grandmother     No Known Problems Paternal Grandfather     No Known Problems Maternal Aunt     No Known Problems Paternal Aunt        Social History:  Social History     Substance and Sexual Activity   Alcohol Use Yes    Frequency: Monthly or less     Social History     Substance and Sexual Activity   Drug Use No     Social History     Tobacco Use   Smoking Status Current Every Day Smoker    Packs/day: 1 00    Types: Cigarettes   Smokeless Tobacco Never Used   Tobacco Comment    5 cigarettes/day           ROS:  Review of Systems Objective:  BP Readings from Last 1 Encounters:   09/08/20 (!) 198/80      Wt Readings from Last 1 Encounters:   09/15/20 63 kg (139 lb)        BMI:   Estimated body mass index is 20 53 kg/m² as calculated from the following:    Height as of this encounter: 5' 9" (1 753 m)  Weight as of this encounter: 63 kg (139 lb)  EXAM:   Physical Exam  Left Hand Exam     Tenderness   Left hand tenderness location: over fracture site      Other   Erythema: absent  Scars: absent  Sensation: normal  Pulse: present    Comments:  Mild edema  Wrist ROM not tested   Full composite fist   2+ radial pulse               Cast application    Date/Time: 9/15/2020 1:26 PM  Performed by: Xu Sun MD  Authorized by: Xu Sun MD     Consent:     Consent obtained:  Verbal    Consent given by:  Patient  Pre-procedure details:     Sensation:  Normal  Procedure details:     Laterality:  Left    Location:  Wrist    Wrist:  L wrist    Strapping: no  Cast type:  Short arm    Supplies:  Cotton padding and fiberglass  Post-procedure details:     Sensation:  Normal    Patient tolerance of procedure: Tolerated well, no immediate complications        Radiographs:  I have personally reviewed pertinent films in PACS and my interpretation is no interval displacement of distal radius fracture since previous x-rays         Scribe Attestation    I,:   Sha Lim am acting as a scribe while in the presence of the attending physician :        I,:   Xu Sun MD personally performed the services described in this documentation    as scribed in my presence :

## 2020-09-29 ENCOUNTER — OFFICE VISIT (OUTPATIENT)
Dept: OBGYN CLINIC | Facility: CLINIC | Age: 55
End: 2020-09-29
Payer: MEDICARE

## 2020-09-29 ENCOUNTER — TELEPHONE (OUTPATIENT)
Dept: OBGYN CLINIC | Facility: HOSPITAL | Age: 55
End: 2020-09-29

## 2020-09-29 VITALS — HEIGHT: 69 IN | BODY MASS INDEX: 20.59 KG/M2 | WEIGHT: 139 LBS

## 2020-09-29 DIAGNOSIS — S62.102D CLOSED FRACTURE OF LEFT WRIST WITH ROUTINE HEALING, SUBSEQUENT ENCOUNTER: Primary | ICD-10-CM

## 2020-09-29 PROCEDURE — 29075 APPL CST ELBW FNGR SHORT ARM: CPT | Performed by: ORTHOPAEDIC SURGERY

## 2020-09-29 PROCEDURE — 99024 POSTOP FOLLOW-UP VISIT: CPT | Performed by: ORTHOPAEDIC SURGERY

## 2020-09-29 NOTE — PROGRESS NOTES
Assessment:     1  Closed fracture of left wrist with routine healing, subsequent encounter          Plan:     Problem List Items Addressed This Visit        Musculoskeletal and Integument    Closed fracture of left wrist - Primary     Old cast was removed today and a new one was applied without issue  Cast precautions were again reviewed  Patient was encouraged to continue elevating the left upper extremity to prevent swelling  We will see her as previously scheduled  She was encouraged to contact the office should questions or concerns arise  Relevant Orders    Cast application           Patient ID: Hiwot Alston is a 54 y o  female  Chief Complaint:  Cast check    Subjective:  42-year-old female presents today for cast check  She is status post left distal radius and ulnar styloid fracture sustained on 09/05/2020  She was transitioned into a short-arm cast on 09/15  She reports over the last several days she has noticed increased tightness and pain in the left wrist   She states she has been elevating her left upper extremity as often as possible  She states even after cast removal she is continuing to experience pain  She denies numbness or tingling  Allergy:  Allergies   Allergen Reactions    Celecoxib GI Bleeding    Codeine GI Intolerance    Motrin [Ibuprofen] GI Intolerance    Oxycodone      Other reaction(s): GI Upset    Vicodin [Hydrocodone-Acetaminophen] GI Intolerance       Medications:  all current active meds have been reviewed    ROS:  Review of Systems   Constitutional: Negative for chills, fever and unexpected weight change  HENT: Negative for hearing loss, nosebleeds and sore throat  Eyes: Negative for pain, redness and visual disturbance  Respiratory: Negative for cough, shortness of breath and wheezing  Cardiovascular: Negative for chest pain, palpitations and leg swelling  Gastrointestinal: Negative for abdominal pain, nausea and vomiting  Endocrine: Negative for polydipsia and polyuria  Genitourinary: Negative for dysuria and hematuria  Musculoskeletal: Negative for arthralgias, joint swelling and myalgias  Skin: Negative for rash and wound  Neurological: Negative for dizziness, numbness and headaches  Psychiatric/Behavioral: Negative for decreased concentration and suicidal ideas  The patient is not nervous/anxious  Objective:  BP Readings from Last 1 Encounters:   09/08/20 (!) 198/80      Wt Readings from Last 1 Encounters:   09/29/20 63 kg (139 lb)        Exam:   Physical Exam  Vitals signs reviewed  Constitutional:       Appearance: She is well-developed  HENT:      Head: Normocephalic and atraumatic  Eyes:      Pupils: Pupils are equal, round, and reactive to light  Neck:      Musculoskeletal: Normal range of motion  Cardiovascular:      Rate and Rhythm: Normal rate and regular rhythm  Pulmonary:      Effort: Pulmonary effort is normal       Breath sounds: Normal breath sounds  Abdominal:      Palpations: Abdomen is soft  Skin:     General: Skin is warm and dry  Neurological:      Mental Status: She is alert and oriented to person, place, and time  Psychiatric:         Behavior: Behavior normal        Left Hand Exam     Other   Erythema: absent  Sensation: normal  Pulse: present    Comments:  Cast in good condition upon arrival  Patient reports pain with even light palpation over the cast    Cast removed without difficulty  Skin underlying cast is intact, no erythema, mild swelling  Forearm compartments are soft and compressible without pain  Digit ROM intact, able to make a loose fist    +elbow flexion/extension  Unable to supinate/pronate the forearm secondary to complaints of pain                   Cast application    Date/Time: 9/29/2020 10:22 AM  Performed by: Ginana Babcock PA-C  Authorized by: Gianna Babcock PA-C     Consent:     Consent obtained:  Verbal    Consent given by:  Patient Risks discussed:  Swelling    Alternatives discussed:  Alternative treatment  Pre-procedure details:     Sensation:  Normal  Procedure details:     Laterality:  Left    Location:  Wrist    Wrist:  L wrist    Strapping: no  Cast type:  Short arm    Supplies:  Fiberglass and cotton padding

## 2020-09-29 NOTE — ASSESSMENT & PLAN NOTE
Old cast was removed today and a new one was applied without issue  Cast precautions were again reviewed  Patient was encouraged to continue elevating the left upper extremity to prevent swelling  We will see her as previously scheduled  She was encouraged to contact the office should questions or concerns arise

## 2020-10-13 ENCOUNTER — OFFICE VISIT (OUTPATIENT)
Dept: OBGYN CLINIC | Facility: CLINIC | Age: 55
End: 2020-10-13
Payer: MEDICARE

## 2020-10-13 ENCOUNTER — APPOINTMENT (OUTPATIENT)
Dept: RADIOLOGY | Facility: CLINIC | Age: 55
End: 2020-10-13
Payer: MEDICARE

## 2020-10-13 VITALS — TEMPERATURE: 98.1 F | WEIGHT: 139 LBS | HEIGHT: 69 IN | BODY MASS INDEX: 20.59 KG/M2

## 2020-10-13 DIAGNOSIS — S62.102D CLOSED FRACTURE OF LEFT WRIST WITH ROUTINE HEALING, SUBSEQUENT ENCOUNTER: ICD-10-CM

## 2020-10-13 DIAGNOSIS — S62.102D CLOSED FRACTURE OF LEFT WRIST WITH ROUTINE HEALING, SUBSEQUENT ENCOUNTER: Primary | ICD-10-CM

## 2020-10-13 PROCEDURE — 29075 APPL CST ELBW FNGR SHORT ARM: CPT | Performed by: ORTHOPAEDIC SURGERY

## 2020-10-13 PROCEDURE — 73110 X-RAY EXAM OF WRIST: CPT

## 2020-10-13 PROCEDURE — 99024 POSTOP FOLLOW-UP VISIT: CPT | Performed by: ORTHOPAEDIC SURGERY

## 2020-10-16 ENCOUNTER — OFFICE VISIT (OUTPATIENT)
Dept: OBGYN CLINIC | Facility: CLINIC | Age: 55
End: 2020-10-16
Payer: MEDICARE

## 2020-10-16 VITALS — BODY MASS INDEX: 20.73 KG/M2 | TEMPERATURE: 97.9 F | WEIGHT: 140 LBS | HEIGHT: 69 IN

## 2020-10-16 DIAGNOSIS — S52.552A OTHER CLOSED EXTRA-ARTICULAR FRACTURE OF DISTAL END OF LEFT RADIUS, INITIAL ENCOUNTER: Primary | ICD-10-CM

## 2020-10-16 PROCEDURE — 99214 OFFICE O/P EST MOD 30 MIN: CPT | Performed by: ORTHOPAEDIC SURGERY

## 2020-10-16 RX ORDER — ACETAMINOPHEN 500 MG
TABLET ORAL
Qty: 30 TABLET | Refills: 0 | Status: SHIPPED | OUTPATIENT
Start: 2020-10-16

## 2020-10-16 RX ORDER — CEFAZOLIN SODIUM 1 G/50ML
1000 SOLUTION INTRAVENOUS ONCE
Status: CANCELLED | OUTPATIENT
Start: 2020-10-16 | End: 2020-10-16

## 2020-10-16 RX ORDER — IBUPROFEN 600 MG/1
TABLET ORAL
Qty: 30 TABLET | Refills: 0 | Status: SHIPPED | OUTPATIENT
Start: 2020-10-16 | End: 2021-02-12 | Stop reason: ALTCHOICE

## 2020-10-16 RX ORDER — OXYCODONE HYDROCHLORIDE AND ACETAMINOPHEN 5; 325 MG/1; MG/1
1 TABLET ORAL EVERY 4 HOURS PRN
Qty: 20 TABLET | Refills: 0 | Status: SHIPPED | OUTPATIENT
Start: 2020-10-16 | End: 2021-02-12 | Stop reason: ALTCHOICE

## 2020-10-19 ENCOUNTER — ANESTHESIA EVENT (OUTPATIENT)
Dept: PERIOP | Facility: HOSPITAL | Age: 55
End: 2020-10-19
Payer: MEDICARE

## 2020-10-20 ENCOUNTER — ANESTHESIA (OUTPATIENT)
Dept: PERIOP | Facility: HOSPITAL | Age: 55
End: 2020-10-20
Payer: MEDICARE

## 2020-10-20 ENCOUNTER — APPOINTMENT (OUTPATIENT)
Dept: RADIOLOGY | Facility: HOSPITAL | Age: 55
End: 2020-10-20
Payer: MEDICARE

## 2020-10-20 ENCOUNTER — HOSPITAL ENCOUNTER (OUTPATIENT)
Facility: HOSPITAL | Age: 55
Setting detail: OUTPATIENT SURGERY
Discharge: HOME | End: 2020-10-20
Attending: ORTHOPAEDIC SURGERY | Admitting: ORTHOPAEDIC SURGERY
Payer: MEDICARE

## 2020-10-20 VITALS
TEMPERATURE: 98.2 F | DIASTOLIC BLOOD PRESSURE: 68 MMHG | RESPIRATION RATE: 18 BRPM | HEIGHT: 69 IN | BODY MASS INDEX: 20.41 KG/M2 | SYSTOLIC BLOOD PRESSURE: 133 MMHG | OXYGEN SATURATION: 94 % | HEART RATE: 71 BPM | WEIGHT: 137.79 LBS

## 2020-10-20 VITALS — HEART RATE: 70 BPM

## 2020-10-20 PROBLEM — Z98.890 PONV (POSTOPERATIVE NAUSEA AND VOMITING): Status: ACTIVE | Noted: 2020-10-20

## 2020-10-20 PROBLEM — R11.2 PONV (POSTOPERATIVE NAUSEA AND VOMITING): Status: ACTIVE | Noted: 2020-10-20

## 2020-10-20 LAB — GLUCOSE SERPL-MCNC: 165 MG/DL (ref 65–140)

## 2020-10-20 PROCEDURE — C1713 ANCHOR/SCREW BN/BN,TIS/BN: HCPCS | Performed by: ORTHOPAEDIC SURGERY

## 2020-10-20 PROCEDURE — 25400 REPAIR RADIUS OR ULNA: CPT | Performed by: PHYSICIAN ASSISTANT

## 2020-10-20 PROCEDURE — 82948 REAGENT STRIP/BLOOD GLUCOSE: CPT

## 2020-10-20 PROCEDURE — 73100 X-RAY EXAM OF WRIST: CPT

## 2020-10-20 PROCEDURE — 25400 REPAIR RADIUS OR ULNA: CPT | Performed by: ORTHOPAEDIC SURGERY

## 2020-10-20 DEVICE — 2.3MM X 20MM LOCKING CORTICAL PEG
Type: IMPLANTABLE DEVICE | Site: WRIST | Status: FUNCTIONAL
Brand: ACUMED

## 2020-10-20 DEVICE — 2.3MM X 20MM LOCKING CORTICAL SCREW
Type: IMPLANTABLE DEVICE | Site: WRIST | Status: FUNCTIONAL
Brand: ACUMED

## 2020-10-20 DEVICE — 3.5MM X 14MM LOCKING HEXALOBE SCREW
Type: IMPLANTABLE DEVICE | Site: WRIST | Status: FUNCTIONAL
Brand: ACUMED

## 2020-10-20 DEVICE — 2.3MM X 18MM LOCKING CORTICAL SCREW
Type: IMPLANTABLE DEVICE | Site: WRIST | Status: FUNCTIONAL
Brand: ACUMED

## 2020-10-20 DEVICE — 3.5MM X 12MM NON-LOCKING HEXALOBE SCREW
Type: IMPLANTABLE DEVICE | Site: WRIST | Status: FUNCTIONAL
Brand: ACUMED

## 2020-10-20 DEVICE — 2.3MM X 18MM LOCKING CORTICAL PEG
Type: IMPLANTABLE DEVICE | Site: WRIST | Status: FUNCTIONAL
Brand: ACUMED

## 2020-10-20 DEVICE — GRAFT BONE CANCELLOUS CHIPS 30ML: Type: IMPLANTABLE DEVICE | Site: WRIST | Status: FUNCTIONAL

## 2020-10-20 DEVICE — ACU-LOC® 2 VDR PLT STD LONG, L
Type: IMPLANTABLE DEVICE | Site: WRIST | Status: FUNCTIONAL
Brand: ACUMED

## 2020-10-20 DEVICE — 3.5MM X 12MM LOCKING HEXALOBE SCREW
Type: IMPLANTABLE DEVICE | Site: WRIST | Status: FUNCTIONAL
Brand: ACUMED

## 2020-10-20 RX ORDER — PROPOFOL 10 MG/ML
INJECTION, EMULSION INTRAVENOUS AS NEEDED
Status: DISCONTINUED | OUTPATIENT
Start: 2020-10-20 | End: 2020-10-20

## 2020-10-20 RX ORDER — MIDAZOLAM HYDROCHLORIDE 2 MG/2ML
INJECTION, SOLUTION INTRAMUSCULAR; INTRAVENOUS AS NEEDED
Status: DISCONTINUED | OUTPATIENT
Start: 2020-10-20 | End: 2020-10-20

## 2020-10-20 RX ORDER — CEFAZOLIN SODIUM 1 G/50ML
1000 SOLUTION INTRAVENOUS ONCE
Status: COMPLETED | OUTPATIENT
Start: 2020-10-20 | End: 2020-10-20

## 2020-10-20 RX ORDER — METOCLOPRAMIDE HYDROCHLORIDE 5 MG/ML
10 INJECTION INTRAMUSCULAR; INTRAVENOUS ONCE AS NEEDED
Status: CANCELLED | OUTPATIENT
Start: 2020-10-20

## 2020-10-20 RX ORDER — ONDANSETRON 2 MG/ML
4 INJECTION INTRAMUSCULAR; INTRAVENOUS ONCE AS NEEDED
Status: CANCELLED | OUTPATIENT
Start: 2020-10-20

## 2020-10-20 RX ORDER — LIDOCAINE HYDROCHLORIDE 10 MG/ML
INJECTION, SOLUTION EPIDURAL; INFILTRATION; INTRACAUDAL; PERINEURAL AS NEEDED
Status: DISCONTINUED | OUTPATIENT
Start: 2020-10-20 | End: 2020-10-20

## 2020-10-20 RX ORDER — ROPIVACAINE HYDROCHLORIDE 5 MG/ML
INJECTION, SOLUTION EPIDURAL; INFILTRATION; PERINEURAL AS NEEDED
Status: DISCONTINUED | OUTPATIENT
Start: 2020-10-20 | End: 2020-10-20

## 2020-10-20 RX ORDER — ACETAMINOPHEN 325 MG/1
650 TABLET ORAL EVERY 6 HOURS PRN
Status: CANCELLED | OUTPATIENT
Start: 2020-10-20

## 2020-10-20 RX ORDER — PROPOFOL 10 MG/ML
INJECTION, EMULSION INTRAVENOUS CONTINUOUS PRN
Status: DISCONTINUED | OUTPATIENT
Start: 2020-10-20 | End: 2020-10-20

## 2020-10-20 RX ORDER — LIDOCAINE HYDROCHLORIDE 10 MG/ML
0.5 INJECTION, SOLUTION EPIDURAL; INFILTRATION; INTRACAUDAL; PERINEURAL ONCE AS NEEDED
Status: DISCONTINUED | OUTPATIENT
Start: 2020-10-20 | End: 2020-10-20 | Stop reason: HOSPADM

## 2020-10-20 RX ORDER — SODIUM CHLORIDE, SODIUM LACTATE, POTASSIUM CHLORIDE, CALCIUM CHLORIDE 600; 310; 30; 20 MG/100ML; MG/100ML; MG/100ML; MG/100ML
50 INJECTION, SOLUTION INTRAVENOUS CONTINUOUS
Status: DISCONTINUED | OUTPATIENT
Start: 2020-10-20 | End: 2020-10-20 | Stop reason: HOSPADM

## 2020-10-20 RX ORDER — ONDANSETRON 2 MG/ML
4 INJECTION INTRAMUSCULAR; INTRAVENOUS EVERY 6 HOURS PRN
Status: CANCELLED | OUTPATIENT
Start: 2020-10-20

## 2020-10-20 RX ORDER — FENTANYL CITRATE/PF 50 MCG/ML
50 SYRINGE (ML) INJECTION
Status: CANCELLED | OUTPATIENT
Start: 2020-10-20

## 2020-10-20 RX ORDER — FENTANYL CITRATE 50 UG/ML
INJECTION, SOLUTION INTRAMUSCULAR; INTRAVENOUS AS NEEDED
Status: DISCONTINUED | OUTPATIENT
Start: 2020-10-20 | End: 2020-10-20

## 2020-10-20 RX ORDER — MAGNESIUM HYDROXIDE 1200 MG/15ML
LIQUID ORAL AS NEEDED
Status: DISCONTINUED | OUTPATIENT
Start: 2020-10-20 | End: 2020-10-20 | Stop reason: HOSPADM

## 2020-10-20 RX ORDER — SODIUM CHLORIDE, SODIUM LACTATE, POTASSIUM CHLORIDE, CALCIUM CHLORIDE 600; 310; 30; 20 MG/100ML; MG/100ML; MG/100ML; MG/100ML
50 INJECTION, SOLUTION INTRAVENOUS CONTINUOUS
Status: CANCELLED | OUTPATIENT
Start: 2020-10-20

## 2020-10-20 RX ADMIN — PROPOFOL 20 MG: 10 INJECTION, EMULSION INTRAVENOUS at 15:03

## 2020-10-20 RX ADMIN — PROPOFOL 90 MG: 10 INJECTION, EMULSION INTRAVENOUS at 13:59

## 2020-10-20 RX ADMIN — FENTANYL CITRATE 25 MCG: 50 INJECTION, SOLUTION INTRAMUSCULAR; INTRAVENOUS at 13:59

## 2020-10-20 RX ADMIN — ROPIVACAINE HYDROCHLORIDE 15 ML: 5 INJECTION, SOLUTION EPIDURAL; INFILTRATION; PERINEURAL at 12:25

## 2020-10-20 RX ADMIN — FENTANYL CITRATE 25 MCG: 50 INJECTION, SOLUTION INTRAMUSCULAR; INTRAVENOUS at 15:05

## 2020-10-20 RX ADMIN — PROPOFOL 100 MCG/KG/MIN: 10 INJECTION, EMULSION INTRAVENOUS at 13:59

## 2020-10-20 RX ADMIN — MIDAZOLAM HYDROCHLORIDE 2 MG: 1 INJECTION, SOLUTION INTRAMUSCULAR; INTRAVENOUS at 12:22

## 2020-10-20 RX ADMIN — LIDOCAINE HYDROCHLORIDE 5 ML: 10 INJECTION, SOLUTION EPIDURAL; INFILTRATION; INTRACAUDAL; PERINEURAL at 12:25

## 2020-10-20 RX ADMIN — SODIUM CHLORIDE, SODIUM LACTATE, POTASSIUM CHLORIDE, AND CALCIUM CHLORIDE 50 ML/HR: .6; .31; .03; .02 INJECTION, SOLUTION INTRAVENOUS at 11:57

## 2020-10-20 RX ADMIN — CEFAZOLIN SODIUM 1000 MG: 1 SOLUTION INTRAVENOUS at 13:55

## 2020-10-26 ENCOUNTER — EVALUATION (OUTPATIENT)
Dept: OCCUPATIONAL THERAPY | Facility: CLINIC | Age: 55
End: 2020-10-26
Payer: MEDICARE

## 2020-10-26 DIAGNOSIS — S62.102G CLOSED FRACTURE OF LEFT WRIST WITH DELAYED HEALING, SUBSEQUENT ENCOUNTER: Primary | ICD-10-CM

## 2020-10-26 PROCEDURE — 97166 OT EVAL MOD COMPLEX 45 MIN: CPT

## 2020-10-26 PROCEDURE — L3906 WHO W/O JOINTS CF: HCPCS

## 2020-10-26 PROCEDURE — 97535 SELF CARE MNGMENT TRAINING: CPT

## 2020-10-27 ENCOUNTER — APPOINTMENT (OUTPATIENT)
Dept: OCCUPATIONAL THERAPY | Facility: CLINIC | Age: 55
End: 2020-10-27
Payer: MEDICARE

## 2020-10-30 ENCOUNTER — APPOINTMENT (OUTPATIENT)
Dept: RADIOLOGY | Facility: CLINIC | Age: 55
End: 2020-10-30
Payer: MEDICARE

## 2020-10-30 ENCOUNTER — OFFICE VISIT (OUTPATIENT)
Dept: OBGYN CLINIC | Facility: CLINIC | Age: 55
End: 2020-10-30

## 2020-10-30 VITALS — HEIGHT: 69 IN | BODY MASS INDEX: 20.29 KG/M2 | WEIGHT: 137 LBS | TEMPERATURE: 98.5 F

## 2020-10-30 DIAGNOSIS — S62.102D CLOSED FRACTURE OF LEFT WRIST WITH ROUTINE HEALING, SUBSEQUENT ENCOUNTER: ICD-10-CM

## 2020-10-30 DIAGNOSIS — S62.102D CLOSED FRACTURE OF LEFT WRIST WITH ROUTINE HEALING, SUBSEQUENT ENCOUNTER: Primary | ICD-10-CM

## 2020-10-30 PROCEDURE — 99024 POSTOP FOLLOW-UP VISIT: CPT | Performed by: ORTHOPAEDIC SURGERY

## 2020-10-30 PROCEDURE — 73110 X-RAY EXAM OF WRIST: CPT

## 2020-11-18 ENCOUNTER — OFFICE VISIT (OUTPATIENT)
Dept: URGENT CARE | Facility: CLINIC | Age: 55
End: 2020-11-18
Payer: MEDICARE

## 2020-11-18 VITALS
HEART RATE: 74 BPM | DIASTOLIC BLOOD PRESSURE: 73 MMHG | OXYGEN SATURATION: 98 % | RESPIRATION RATE: 18 BRPM | TEMPERATURE: 97.8 F | SYSTOLIC BLOOD PRESSURE: 171 MMHG

## 2020-11-18 DIAGNOSIS — J01.00 ACUTE MAXILLARY SINUSITIS, RECURRENCE NOT SPECIFIED: Primary | ICD-10-CM

## 2020-11-18 PROCEDURE — G0463 HOSPITAL OUTPT CLINIC VISIT: HCPCS | Performed by: PHYSICIAN ASSISTANT

## 2020-11-18 PROCEDURE — 99213 OFFICE O/P EST LOW 20 MIN: CPT | Performed by: PHYSICIAN ASSISTANT

## 2020-11-18 RX ORDER — AMOXICILLIN AND CLAVULANATE POTASSIUM 875; 125 MG/1; MG/1
1 TABLET, FILM COATED ORAL EVERY 12 HOURS SCHEDULED
Qty: 20 TABLET | Refills: 0 | Status: SHIPPED | OUTPATIENT
Start: 2020-11-18 | End: 2020-11-28

## 2020-12-15 ENCOUNTER — TRANSCRIBE ORDERS (OUTPATIENT)
Dept: LAB | Facility: HOSPITAL | Age: 55
End: 2020-12-15

## 2020-12-15 ENCOUNTER — LAB (OUTPATIENT)
Dept: LAB | Facility: HOSPITAL | Age: 55
End: 2020-12-15
Payer: MEDICARE

## 2020-12-15 DIAGNOSIS — E06.3 HYPOTHYROIDISM DUE TO HASHIMOTO'S THYROIDITIS: Primary | ICD-10-CM

## 2020-12-15 DIAGNOSIS — E06.3 HYPOTHYROIDISM DUE TO HASHIMOTO'S THYROIDITIS: ICD-10-CM

## 2020-12-15 DIAGNOSIS — E03.8 HYPOTHYROIDISM DUE TO HASHIMOTO'S THYROIDITIS: ICD-10-CM

## 2020-12-15 DIAGNOSIS — E03.8 HYPOTHYROIDISM DUE TO HASHIMOTO'S THYROIDITIS: Primary | ICD-10-CM

## 2020-12-15 LAB
ALBUMIN SERPL BCP-MCNC: 4 G/DL (ref 3.5–5.7)
ALP SERPL-CCNC: 57 U/L (ref 40–150)
ALT SERPL W P-5'-P-CCNC: 14 U/L (ref 7–52)
ANION GAP SERPL CALCULATED.3IONS-SCNC: 6 MMOL/L (ref 4–13)
AST SERPL W P-5'-P-CCNC: 13 U/L (ref 13–39)
BASOPHILS # BLD AUTO: 0.1 THOUSANDS/ΜL (ref 0–0.1)
BASOPHILS NFR BLD AUTO: 2 % (ref 0–2)
BILIRUB SERPL-MCNC: 0.3 MG/DL (ref 0.2–1)
BUN SERPL-MCNC: 9 MG/DL (ref 7–25)
CALCIUM SERPL-MCNC: 9.3 MG/DL (ref 8.6–10.5)
CHLORIDE SERPL-SCNC: 107 MMOL/L (ref 98–107)
CHOLEST SERPL-MCNC: 172 MG/DL (ref 0–200)
CO2 SERPL-SCNC: 28 MMOL/L (ref 21–31)
CREAT SERPL-MCNC: 0.6 MG/DL (ref 0.6–1.2)
CREAT UR-MCNC: 82.5 MG/DL
EOSINOPHIL # BLD AUTO: 0.2 THOUSAND/ΜL (ref 0–0.61)
EOSINOPHIL NFR BLD AUTO: 3 % (ref 0–5)
ERYTHROCYTE [DISTWIDTH] IN BLOOD BY AUTOMATED COUNT: 12.9 % (ref 11.5–14.5)
EST. AVERAGE GLUCOSE BLD GHB EST-MCNC: 206 MG/DL
GFR SERPL CREATININE-BSD FRML MDRD: 103 ML/MIN/1.73SQ M
GLUCOSE P FAST SERPL-MCNC: 159 MG/DL (ref 65–99)
HBA1C MFR BLD: 8.8 %
HCT VFR BLD AUTO: 41.2 % (ref 42–47)
HDLC SERPL-MCNC: 60 MG/DL
HGB BLD-MCNC: 13.7 G/DL (ref 12–16)
LDLC SERPL CALC-MCNC: 97 MG/DL (ref 0–100)
LYMPHOCYTES # BLD AUTO: 1.7 THOUSANDS/ΜL (ref 0.6–4.47)
LYMPHOCYTES NFR BLD AUTO: 25 % (ref 21–51)
MCH RBC QN AUTO: 31.6 PG (ref 26–34)
MCHC RBC AUTO-ENTMCNC: 33.3 G/DL (ref 31–37)
MCV RBC AUTO: 95 FL (ref 81–99)
MICROALBUMIN UR-MCNC: 10.6 MG/L (ref 0–20)
MICROALBUMIN/CREAT 24H UR: 13 MG/G CREATININE (ref 0–30)
MONOCYTES # BLD AUTO: 0.6 THOUSAND/ΜL (ref 0.17–1.22)
MONOCYTES NFR BLD AUTO: 9 % (ref 2–12)
NEUTROPHILS # BLD AUTO: 4.2 THOUSANDS/ΜL (ref 1.4–6.5)
NEUTS SEG NFR BLD AUTO: 61 % (ref 42–75)
NONHDLC SERPL-MCNC: 112 MG/DL
PLATELET # BLD AUTO: 335 THOUSANDS/UL (ref 149–390)
PMV BLD AUTO: 9.1 FL (ref 8.6–11.7)
POTASSIUM SERPL-SCNC: 3.8 MMOL/L (ref 3.5–5.5)
PROT SERPL-MCNC: 6.5 G/DL (ref 6.4–8.9)
RBC # BLD AUTO: 4.35 MILLION/UL (ref 3.9–5.2)
SODIUM SERPL-SCNC: 141 MMOL/L (ref 134–143)
TRIGL SERPL-MCNC: 76 MG/DL (ref 44–166)
TSH SERPL DL<=0.05 MIU/L-ACNC: 2.68 UIU/ML (ref 0.45–5.33)
WBC # BLD AUTO: 7 THOUSAND/UL (ref 4.8–10.8)

## 2020-12-15 PROCEDURE — 84443 ASSAY THYROID STIM HORMONE: CPT

## 2020-12-15 PROCEDURE — 82570 ASSAY OF URINE CREATININE: CPT

## 2020-12-15 PROCEDURE — 82043 UR ALBUMIN QUANTITATIVE: CPT

## 2020-12-15 PROCEDURE — 85025 COMPLETE CBC W/AUTO DIFF WBC: CPT

## 2020-12-15 PROCEDURE — 80061 LIPID PANEL: CPT

## 2020-12-15 PROCEDURE — 80053 COMPREHEN METABOLIC PANEL: CPT

## 2020-12-15 PROCEDURE — 83036 HEMOGLOBIN GLYCOSYLATED A1C: CPT

## 2020-12-15 PROCEDURE — 36415 COLL VENOUS BLD VENIPUNCTURE: CPT

## 2020-12-22 ENCOUNTER — TELEPHONE (OUTPATIENT)
Dept: ADMINISTRATIVE | Facility: OTHER | Age: 55
End: 2020-12-22

## 2020-12-22 NOTE — TELEPHONE ENCOUNTER
----- Message from Jose Daniel Raymundo sent at 12/21/2020  4:07 PM EST -----  Regarding: care gap request  12/21/20 4:07 PM    Hello, our patient Soliman Warrenton has had Diabetic Foot Exam completed/performed  Please assist in updating the patient chart by pulling the document from the Media Tab  The date of service is 8/12/2020       Thank you  [phone]

## 2020-12-29 ENCOUNTER — TELEPHONE (OUTPATIENT)
Dept: ADMINISTRATIVE | Facility: OTHER | Age: 55
End: 2020-12-29

## 2021-02-12 ENCOUNTER — TELEMEDICINE (OUTPATIENT)
Dept: FAMILY MEDICINE CLINIC | Facility: CLINIC | Age: 56
End: 2021-02-12
Payer: MEDICARE

## 2021-02-12 VITALS — WEIGHT: 142 LBS | TEMPERATURE: 98 F | BODY MASS INDEX: 21.03 KG/M2 | HEIGHT: 69 IN

## 2021-02-12 DIAGNOSIS — J45.909 PERSISTENT ASTHMA WITHOUT COMPLICATION, UNSPECIFIED ASTHMA SEVERITY: ICD-10-CM

## 2021-02-12 DIAGNOSIS — E10.42 TYPE 1 DIABETES MELLITUS WITH DIABETIC POLYNEUROPATHY (HCC): ICD-10-CM

## 2021-02-12 DIAGNOSIS — Z12.31 ENCOUNTER FOR SCREENING MAMMOGRAM FOR MALIGNANT NEOPLASM OF BREAST: Primary | ICD-10-CM

## 2021-02-12 DIAGNOSIS — F43.22 ACUTE ADJUSTMENT DISORDER WITH ANXIETY: ICD-10-CM

## 2021-02-12 DIAGNOSIS — J44.9 CHRONIC OBSTRUCTIVE PULMONARY DISEASE, UNSPECIFIED COPD TYPE (HCC): ICD-10-CM

## 2021-02-12 DIAGNOSIS — J30.1 ALLERGIC RHINITIS DUE TO POLLEN, UNSPECIFIED SEASONALITY: ICD-10-CM

## 2021-02-12 PROBLEM — Z98.890 PONV (POSTOPERATIVE NAUSEA AND VOMITING): Status: RESOLVED | Noted: 2020-10-20 | Resolved: 2021-02-12

## 2021-02-12 PROBLEM — R11.2 PONV (POSTOPERATIVE NAUSEA AND VOMITING): Status: RESOLVED | Noted: 2020-10-20 | Resolved: 2021-02-12

## 2021-02-12 PROCEDURE — 99213 OFFICE O/P EST LOW 20 MIN: CPT | Performed by: FAMILY MEDICINE

## 2021-02-12 RX ORDER — ESCITALOPRAM OXALATE 10 MG/1
10 TABLET ORAL DAILY
Qty: 30 TABLET | Refills: 5 | Status: SHIPPED | OUTPATIENT
Start: 2021-02-12 | End: 2021-11-18 | Stop reason: SDUPTHER

## 2021-02-12 RX ORDER — BLOOD-GLUCOSE SENSOR
1 EACH MISCELLANEOUS DAILY
COMMUNITY

## 2021-02-12 NOTE — PROGRESS NOTES
Virtual Regular Visit      Assessment/Plan:    Problem List Items Addressed This Visit        Endocrine    Type 1 diabetes mellitus with diabetic polyneuropathy ([facility] [address] )       Lab Results   Component Value Date    HGBA1C 8 8 (H) 12/15/2020    she continues to follow-up with endocrinology through [facility]  Her A1c is somewhat improved at 8 8 from her previous 9 3  We asked her to follow-up closely with endocrinology continue to work on improving her blood sugar  We also encouraged her to get her diabetic eye exam which is overdue  Respiratory    Chronic obstructive pulmonary disease ([facility] [address] )       Continue with Singulair as well as albuterol by nebulization  Symptoms currently are fairly well controlled          Asthma    Allergic rhinitis       Continue with Singulair  Other    Acute adjustment disorder with anxiety      She continues to do well with Lexapro  She is going to continue with same  We refilled that today  Other Visit Diagnoses     Encounter for screening mammogram for malignant neoplasm of breast    -  Primary          Last her to return in 6 months or sooner as needed  Continue follow with endocrinology  Needs to have eye exam performed  Needs to return colorectal cancer screening CT which she has at home  Reason for visit is   Chief Complaint   Patient presents with    Follow-up     pt having a virtual med check today   Virtual Regular Visit        Encounter provider Denia Shell MD    Provider located at [address] [id number]      Recent Visits  No visits were found meeting these conditions     Showing recent visits within past 7 days and meeting all other requirements     Today's Visits  Date Type Provider Dept   02/12/21 Telemedicine Denia Shell MD [facility]   Showing today's visits and meeting all other requirements     Future Appointments  No visits were found meeting these conditions  Showing future appointments within next 150 days and meeting all other requirements        The patient was identified by name and date of birth  Eliza Long was informed that this is a telemedicine visit and that the visit is being conducted through [facility] and patient was informed that this is a secure, HIPAA-compliant platform  She agrees to proceed     My office door was closed  No one else was in the room  She acknowledged consent and understanding of privacy and security of the video platform  The patient has agreed to participate and understands they can discontinue the visit at any time  Patient is aware this is a billable service  Subjective  Eliza Long is a 54 y o  female   I am in a lot of pain from my back  Pulled a rib muscle, shoulder and neck aching  Gabapentin zoned me, did not help me  Cymbalta not effective  Tried therapy without effect  Cold makes it worse  Awakens with back pain    Shoulders hurt         HPI     Past Medical History:   Diagnosis Date    Abscess of pubic region 3/9/2018    Acute gouty arthritis 10/16/2015    Asthma     Diabetes mellitus ([facility] [address]     Disease of thyroid gland     Fibromyalgia syndrome     High cholesterol     MRSA infection 3/13/2018    Neuropathic pain of both legs     PONV (postoperative nausea and vomiting)     Spinal stenosis        Past Surgical History:   Procedure Laterality Date    BACK SURGERY      l5 - s1 fused    BREAST BIOPSY Left     11yrs ago   benign    CARPAL TUNNEL RELEASE Left     HYSTERECTOMY      SD INCISE FINGER TENDON SHEATH Left 8/16/2016    Procedure: LONG TRIGGER FINGER RELEASE ;  Surgeon: Adeel Hdez MD;  [facility];  Service: Orthopedics    SD INCISE FINGER TENDON SHEATH Right 6/9/2017    Procedure: THUMB TRIGGER RELEASE;  Surgeon: Adeel Hdez MD;  [facility];  Service: Orthopedics    SD OPEN RX DISTAL RADIUS FX, INTRA-ARTICULAR, 2 FRAG Left 10/20/2020 Procedure: OPEN REDUCTION W/ INTERNAL FIXATION (ORIF) Left distal radius with osteotomy;  Surgeon: Lyndsey Carty MD;  [facility];  Service: Orthopedics    HI WRIST Jenae Wilma LIG Right 10/14/2016    Procedure: ENDOSCOPIC CARPAL TUNNEL RELEASE ;  Surgeon: Praveen Sam MD;  [facility];  Service: Orthopedics    HI WRIST Jenae Wilma LIG Left 8/16/2016    Procedure: ENDOSCOPIC CARPAL TUNNEL RELEASE ;  Surgeon: Praveen Sam MD;  [facility];  Service: Orthopedics    ULNAR NERVE TRANSPOSITION Right        Current Outpatient Medications   Medication Sig Dispense Refill    albuterol (2 5 mg/3 mL) 0 083 % nebulizer solution Take 1 vial (2 5 mg total) by nebulization 4 (four) times a day 75 mL 5    atorvastatin (LIPITOR) 80 mg tablet Take 80 mg by mouth daily  3    Blood Glucose Calibration (OT ULTRA/FASTTK CNTRL SOLN) SOLN by In Vitro route      cetirizine (ZyrTEC) 10 mg tablet Take 10 mg by mouth daily   Continuous Blood Gluc Sensor (Dexcom G6 Sensor) MISC Use 1 each daily      escitalopram (LEXAPRO) 10 mg tablet Take 1 tablet (10 mg total) by mouth daily 30 tablet 5    glucose blood test strip 4 strips daily      HUMALOG 100 UNIT/ML injection       levothyroxine 112 mcg tablet Take 112 mcg by mouth daily  0    montelukast (SINGULAIR) 10 mg tablet Take 1 tablet (10 mg total) by mouth daily 30 tablet 5    PROAIR  (90 Base) MCG/ACT inhaler Inhale 2 puffs 4 (four) times a day As directed 1 Inhaler 1    Respiratory Therapy Supplies (JET-AIR REUSABLE NEBULIZER SYS) KIT by Does not apply route every 4 (four) hours as needed (wheezing) 1 each 1    acetaminophen (TYLENOL) 500 mg tablet Take 1 500mg tablet in the morning prior to surgery, then 1 tablet every 6 hours for 5-7 days  (Patient not taking: Reported on 2/12/2021) 30 tablet 0     No current facility-administered medications for this visit           Allergies   Allergen Reactions    Celecoxib GI Bleeding    Codeine GI Intolerance    Motrin [Ibuprofen] GI Intolerance    Oxycodone      Other reaction(s): GI Upset    Vicodin [Hydrocodone-Acetaminophen] GI Intolerance       Review of Systems   Constitutional: Negative  Respiratory: Positive for shortness of breath and wheezing  Albuterol twice a day and c/w Singulair  Cardiovascular: Negative  Gastrointestinal: Negative  Endocrine: Negative  Genitourinary: Negative  Musculoskeletal: Positive for arthralgias, back pain, neck pain and neck stiffness  Neurological: Positive for weakness  Negative for headaches  Psychiatric/Behavioral: Positive for sleep disturbance  Negative for dysphoric mood and suicidal ideas  The patient is nervous/anxious  Lexapro very helpful  Video Exam    Vitals:    02/12/21 1030   Temp: 98 °F (36 7 °C)   Weight: 64 4 kg (142 lb)   Height: 5' 9" (1 753 m)       Physical Exam  Constitutional:       Appearance: Normal appearance  She is not ill-appearing  Pulmonary:      Effort: Pulmonary effort is normal  No respiratory distress  Musculoskeletal:      Right lower leg: No edema  Left lower leg: No edema  Neurological:      Mental Status: She is alert and oriented to person, place, and time  Psychiatric:         Mood and Affect: Mood normal          Thought Content: Thought content normal          Judgment: Judgment normal           I spent 10 minutes directly with the patient during this visit      VIRTUAL VISIT [address] acknowledges that she has consented to an online visit or consultation  She understands that the online visit is based solely on information provided by her, and that, in the absence of a face-to-face physical evaluation by the physician, the diagnosis she receives is both limited and provisional in terms of accuracy and completeness  This is not intended to replace a full medical face-to-face evaluation by the physician  Pradeep Mccullough understands and accepts these terms

## 2021-02-12 NOTE — ASSESSMENT & PLAN NOTE
Continue with Singulair as well as albuterol by nebulization    Symptoms currently are fairly well controlled

## 2021-02-12 NOTE — ASSESSMENT & PLAN NOTE
Lab Results   Component Value Date    HGBA1C 8 8 (H) 12/15/2020    she continues to follow-up with endocrinology through [facility]  Her A1c is somewhat improved at 8 8 from her previous 9 3  We asked her to follow-up closely with endocrinology continue to work on improving her blood sugar  We also encouraged her to get her diabetic eye exam which is overdue

## 2021-04-09 DIAGNOSIS — J45.909 ASTHMA, UNSPECIFIED ASTHMA SEVERITY, UNSPECIFIED WHETHER COMPLICATED, UNSPECIFIED WHETHER PERSISTENT: ICD-10-CM

## 2021-04-09 RX ORDER — ALBUTEROL SULFATE 2.5 MG/3ML
2.5 SOLUTION RESPIRATORY (INHALATION) 4 TIMES DAILY
Qty: 75 ML | Refills: 5 | Status: SHIPPED | OUTPATIENT
Start: 2021-04-09 | End: 2021-11-18 | Stop reason: SDUPTHER

## 2021-04-14 ENCOUNTER — APPOINTMENT (OUTPATIENT)
Dept: LAB | Facility: HOSPITAL | Age: 56
End: 2021-04-14
Payer: MEDICARE

## 2021-04-14 ENCOUNTER — TRANSCRIBE ORDERS (OUTPATIENT)
Dept: LAB | Facility: HOSPITAL | Age: 56
End: 2021-04-14

## 2021-04-14 DIAGNOSIS — E10.42 TYPE 1 DIABETES MELLITUS WITH POLYNEUROPATHY (HCC): Primary | ICD-10-CM

## 2021-04-14 DIAGNOSIS — E10.42 TYPE 1 DIABETES MELLITUS WITH POLYNEUROPATHY (HCC): ICD-10-CM

## 2021-04-14 LAB
ALBUMIN SERPL BCP-MCNC: 4.1 G/DL (ref 3.5–5.7)
ALP SERPL-CCNC: 57 U/L (ref 40–150)
ALT SERPL W P-5'-P-CCNC: 11 U/L (ref 7–52)
ANION GAP SERPL CALCULATED.3IONS-SCNC: 9 MMOL/L (ref 4–13)
AST SERPL W P-5'-P-CCNC: 12 U/L (ref 13–39)
BASOPHILS # BLD AUTO: 0.1 THOUSANDS/ΜL (ref 0–0.1)
BASOPHILS NFR BLD AUTO: 2 % (ref 0–2)
BILIRUB SERPL-MCNC: 0.5 MG/DL (ref 0.2–1)
BUN SERPL-MCNC: 9 MG/DL (ref 7–25)
CALCIUM SERPL-MCNC: 9.4 MG/DL (ref 8.6–10.5)
CHLORIDE SERPL-SCNC: 102 MMOL/L (ref 98–107)
CHOLEST SERPL-MCNC: 205 MG/DL (ref 0–200)
CO2 SERPL-SCNC: 32 MMOL/L (ref 21–31)
CREAT SERPL-MCNC: 0.73 MG/DL (ref 0.6–1.2)
EOSINOPHIL # BLD AUTO: 0.3 THOUSAND/ΜL (ref 0–0.61)
EOSINOPHIL NFR BLD AUTO: 3 % (ref 0–5)
ERYTHROCYTE [DISTWIDTH] IN BLOOD BY AUTOMATED COUNT: 13.5 % (ref 11.5–14.5)
GFR SERPL CREATININE-BSD FRML MDRD: 93 ML/MIN/1.73SQ M
GLUCOSE P FAST SERPL-MCNC: 131 MG/DL (ref 65–99)
HCT VFR BLD AUTO: 46.1 % (ref 42–47)
HDLC SERPL-MCNC: 57 MG/DL
HGB BLD-MCNC: 15.3 G/DL (ref 12–16)
LDLC SERPL CALC-MCNC: 127 MG/DL (ref 0–100)
LYMPHOCYTES # BLD AUTO: 2.9 THOUSANDS/ΜL (ref 0.6–4.47)
LYMPHOCYTES NFR BLD AUTO: 35 % (ref 21–51)
MCH RBC QN AUTO: 30.6 PG (ref 26–34)
MCHC RBC AUTO-ENTMCNC: 33.1 G/DL (ref 31–37)
MCV RBC AUTO: 92 FL (ref 81–99)
MONOCYTES # BLD AUTO: 0.8 THOUSAND/ΜL (ref 0.17–1.22)
MONOCYTES NFR BLD AUTO: 10 % (ref 2–12)
NEUTROPHILS # BLD AUTO: 4.2 THOUSANDS/ΜL (ref 1.4–6.5)
NEUTS SEG NFR BLD AUTO: 51 % (ref 42–75)
NONHDLC SERPL-MCNC: 148 MG/DL
PLATELET # BLD AUTO: 391 THOUSANDS/UL (ref 149–390)
PMV BLD AUTO: 8.5 FL (ref 8.6–11.7)
POTASSIUM SERPL-SCNC: 3.8 MMOL/L (ref 3.5–5.5)
PROT SERPL-MCNC: 6.6 G/DL (ref 6.4–8.9)
RBC # BLD AUTO: 5 MILLION/UL (ref 3.9–5.2)
SODIUM SERPL-SCNC: 143 MMOL/L (ref 134–143)
TRIGL SERPL-MCNC: 105 MG/DL (ref 44–166)
WBC # BLD AUTO: 8.3 THOUSAND/UL (ref 4.8–10.8)

## 2021-04-14 PROCEDURE — 80053 COMPREHEN METABOLIC PANEL: CPT

## 2021-04-14 PROCEDURE — 80061 LIPID PANEL: CPT

## 2021-04-14 PROCEDURE — 85025 COMPLETE CBC W/AUTO DIFF WBC: CPT

## 2021-04-14 PROCEDURE — 36415 COLL VENOUS BLD VENIPUNCTURE: CPT

## 2021-04-21 ENCOUNTER — TELEPHONE (OUTPATIENT)
Dept: ADMINISTRATIVE | Facility: OTHER | Age: 56
End: 2021-04-21

## 2021-04-21 NOTE — TELEPHONE ENCOUNTER
Upon review of the In Basket request we have found/obtained the documentation  After careful review of the document we are unable to complete this request for Diabetic Eye Exam because the documentation does not have the result(s) needed to close the requested care gap(s)  and have found this is a [employer] issue  After review of the Patient Chart, it was noted that the Colonoscopy was completed internally  With the new [employer] workflow, GI Providers are required to finalize the Colonoscopy report after the Pathology report is returned and reviewed  Until this workflow is completed by the GI Providers, the CRC Screening for Colonoscopy will not appear within the HM Activity  Please email example to [email or web address] for assistance in reaching out to the appropriate GI Provider  NO DM foot exam performed  Any additional questions or concerns should be emailed to the Practice Liaisons via [employer] email, please do not reply via In Basket      Thank you  Brook Hdez MA

## 2021-04-21 NOTE — TELEPHONE ENCOUNTER
----- Message from Shellie Aponte sent at 4/20/2021 11:29 AM EDT -----  Regarding: care gap request  04/20/21 11:29 AM    Hello, our patient Lana Payne has had Diabetic Foot Exam completed/performed  Please assist in updating the patient chart by making an External outreach to [address] [place] [address] located in [place]  The date of service is 4-      Thank you,  [id number]

## 2021-07-09 ENCOUNTER — TELEPHONE (OUTPATIENT)
Dept: FAMILY MEDICINE CLINIC | Facility: CLINIC | Age: 56
End: 2021-07-09

## 2021-08-27 ENCOUNTER — APPOINTMENT (OUTPATIENT)
Dept: LAB | Facility: HOSPITAL | Age: 56
End: 2021-08-27
Payer: MEDICARE

## 2021-08-27 DIAGNOSIS — E10.42 TYPE 1 DIABETES MELLITUS WITH POLYNEUROPATHY (HCC): ICD-10-CM

## 2021-08-27 LAB
ALBUMIN SERPL BCP-MCNC: 4.1 G/DL (ref 3.5–5.7)
ALP SERPL-CCNC: 59 U/L (ref 40–150)
ALT SERPL W P-5'-P-CCNC: 14 U/L (ref 7–52)
ANION GAP SERPL CALCULATED.3IONS-SCNC: 7 MMOL/L (ref 4–13)
AST SERPL W P-5'-P-CCNC: 13 U/L (ref 13–39)
BASOPHILS # BLD AUTO: 0.1 THOUSANDS/ΜL (ref 0–0.1)
BASOPHILS NFR BLD AUTO: 1 % (ref 0–2)
BILIRUB SERPL-MCNC: 0.3 MG/DL (ref 0.2–1)
BUN SERPL-MCNC: 10 MG/DL (ref 7–25)
CALCIUM SERPL-MCNC: 9.3 MG/DL (ref 8.6–10.5)
CHLORIDE SERPL-SCNC: 104 MMOL/L (ref 98–107)
CHOLEST SERPL-MCNC: 153 MG/DL (ref 0–200)
CO2 SERPL-SCNC: 29 MMOL/L (ref 21–31)
CREAT SERPL-MCNC: 0.62 MG/DL (ref 0.6–1.2)
EOSINOPHIL # BLD AUTO: 0.3 THOUSAND/ΜL (ref 0–0.61)
EOSINOPHIL NFR BLD AUTO: 4 % (ref 0–5)
ERYTHROCYTE [DISTWIDTH] IN BLOOD BY AUTOMATED COUNT: 13.3 % (ref 11.5–14.5)
EST. AVERAGE GLUCOSE BLD GHB EST-MCNC: 217 MG/DL
GFR SERPL CREATININE-BSD FRML MDRD: 101 ML/MIN/1.73SQ M
GLUCOSE P FAST SERPL-MCNC: 141 MG/DL (ref 65–99)
HBA1C MFR BLD: 9.2 %
HCT VFR BLD AUTO: 42.5 % (ref 42–47)
HDLC SERPL-MCNC: 59 MG/DL
HGB BLD-MCNC: 14.2 G/DL (ref 12–16)
LDLC SERPL CALC-MCNC: 79 MG/DL (ref 0–100)
LYMPHOCYTES # BLD AUTO: 2.2 THOUSANDS/ΜL (ref 0.6–4.47)
LYMPHOCYTES NFR BLD AUTO: 29 % (ref 21–51)
MCH RBC QN AUTO: 30.9 PG (ref 26–34)
MCHC RBC AUTO-ENTMCNC: 33.4 G/DL (ref 31–37)
MCV RBC AUTO: 93 FL (ref 81–99)
MONOCYTES # BLD AUTO: 0.7 THOUSAND/ΜL (ref 0.17–1.22)
MONOCYTES NFR BLD AUTO: 9 % (ref 2–12)
NEUTROPHILS # BLD AUTO: 4.4 THOUSANDS/ΜL (ref 1.4–6.5)
NEUTS SEG NFR BLD AUTO: 57 % (ref 42–75)
NONHDLC SERPL-MCNC: 94 MG/DL
PLATELET # BLD AUTO: 368 THOUSANDS/UL (ref 149–390)
PMV BLD AUTO: 8.8 FL (ref 8.6–11.7)
POTASSIUM SERPL-SCNC: 3.6 MMOL/L (ref 3.5–5.5)
PROT SERPL-MCNC: 6.5 G/DL (ref 6.4–8.9)
RBC # BLD AUTO: 4.58 MILLION/UL (ref 3.9–5.2)
SODIUM SERPL-SCNC: 140 MMOL/L (ref 134–143)
TRIGL SERPL-MCNC: 73 MG/DL (ref 44–166)
TSH SERPL DL<=0.05 MIU/L-ACNC: 4.4 UIU/ML (ref 0.45–5.33)
WBC # BLD AUTO: 7.7 THOUSAND/UL (ref 4.8–10.8)

## 2021-08-27 PROCEDURE — 85025 COMPLETE CBC W/AUTO DIFF WBC: CPT

## 2021-08-27 PROCEDURE — 80061 LIPID PANEL: CPT

## 2021-08-27 PROCEDURE — 80053 COMPREHEN METABOLIC PANEL: CPT

## 2021-08-27 PROCEDURE — 84443 ASSAY THYROID STIM HORMONE: CPT

## 2021-08-27 PROCEDURE — 36415 COLL VENOUS BLD VENIPUNCTURE: CPT

## 2021-08-27 PROCEDURE — 83036 HEMOGLOBIN GLYCOSYLATED A1C: CPT

## 2021-09-14 ENCOUNTER — TELEPHONE (OUTPATIENT)
Dept: ADMINISTRATIVE | Facility: OTHER | Age: 56
End: 2021-09-14

## 2021-09-14 NOTE — TELEPHONE ENCOUNTER
----- Message from Tena Vargas sent at 9/10/2021  2:17 PM EDT -----  Regarding: care gap request  09/10/21 2:17 PM    Hello, our patient Darryle Spangle has had Diabetic Foot Exam completed/performed  Please assist in updating the patient chart by pulling the Care Everywhere (CE) document  The date of service is 8/30/2021 in office note       Thank you  [id number]

## 2021-09-14 NOTE — TELEPHONE ENCOUNTER
Upon review of the In Basket request we were able to locate, review, and update the patient chart as requested for Diabetic Foot Exam     Any additional questions or concerns should be emailed to the Practice Liaisons via [email or web address] email, please do not reply via In Basket      Thank you  Remington Baldwin

## 2021-09-21 ENCOUNTER — TELEPHONE (OUTPATIENT)
Dept: OTHER | Facility: OTHER | Age: 56
End: 2021-09-21

## 2021-11-18 ENCOUNTER — OFFICE VISIT (OUTPATIENT)
Dept: FAMILY MEDICINE CLINIC | Facility: CLINIC | Age: 56
End: 2021-11-18
Payer: MEDICARE

## 2021-11-18 VITALS
WEIGHT: 140 LBS | BODY MASS INDEX: 20.73 KG/M2 | HEIGHT: 69 IN | SYSTOLIC BLOOD PRESSURE: 138 MMHG | DIASTOLIC BLOOD PRESSURE: 72 MMHG

## 2021-11-18 DIAGNOSIS — M06.9 RHEUMATOID ARTHRITIS INVOLVING MULTIPLE SITES, UNSPECIFIED WHETHER RHEUMATOID FACTOR PRESENT (HCC): ICD-10-CM

## 2021-11-18 DIAGNOSIS — J45.909 ASTHMA, UNSPECIFIED ASTHMA SEVERITY, UNSPECIFIED WHETHER COMPLICATED, UNSPECIFIED WHETHER PERSISTENT: ICD-10-CM

## 2021-11-18 DIAGNOSIS — J44.9 CHRONIC OBSTRUCTIVE PULMONARY DISEASE, UNSPECIFIED COPD TYPE (HCC): ICD-10-CM

## 2021-11-18 DIAGNOSIS — Z79.4 ENCOUNTER FOR LONG-TERM (CURRENT) USE OF INSULIN (HCC): ICD-10-CM

## 2021-11-18 DIAGNOSIS — Z00.00 MEDICARE ANNUAL WELLNESS VISIT, SUBSEQUENT: Primary | ICD-10-CM

## 2021-11-18 DIAGNOSIS — F43.22 ACUTE ADJUSTMENT DISORDER WITH ANXIETY: ICD-10-CM

## 2021-11-18 DIAGNOSIS — E10.42 TYPE 1 DIABETES MELLITUS WITH DIABETIC POLYNEUROPATHY (HCC): ICD-10-CM

## 2021-11-18 DIAGNOSIS — L20.9 ATOPIC DERMATITIS, UNSPECIFIED TYPE: ICD-10-CM

## 2021-11-18 DIAGNOSIS — J45.909 PERSISTENT ASTHMA WITHOUT COMPLICATION, UNSPECIFIED ASTHMA SEVERITY: ICD-10-CM

## 2021-11-18 PROCEDURE — 99214 OFFICE O/P EST MOD 30 MIN: CPT | Performed by: FAMILY MEDICINE

## 2021-11-18 PROCEDURE — G0439 PPPS, SUBSEQ VISIT: HCPCS | Performed by: FAMILY MEDICINE

## 2021-11-18 RX ORDER — ALBUTEROL SULFATE 2.5 MG/3ML
2.5 SOLUTION RESPIRATORY (INHALATION) 4 TIMES DAILY
Qty: 75 ML | Refills: 5 | Status: SHIPPED | OUTPATIENT
Start: 2021-11-18 | End: 2022-08-01 | Stop reason: SDUPTHER

## 2021-11-18 RX ORDER — BETAMETHASONE DIPROPIONATE 0.05 %
OINTMENT (GRAM) TOPICAL 2 TIMES DAILY
Qty: 45 G | Refills: 0 | Status: SHIPPED | OUTPATIENT
Start: 2021-11-18

## 2021-11-18 RX ORDER — ESCITALOPRAM OXALATE 10 MG/1
10 TABLET ORAL DAILY
Qty: 30 TABLET | Refills: 5 | Status: SHIPPED | OUTPATIENT
Start: 2021-11-18 | End: 2022-08-01 | Stop reason: SDUPTHER

## 2021-11-18 RX ORDER — MONTELUKAST SODIUM 10 MG/1
10 TABLET ORAL DAILY
Qty: 30 TABLET | Refills: 5 | Status: SHIPPED | OUTPATIENT
Start: 2021-11-18

## 2022-01-31 ENCOUNTER — APPOINTMENT (OUTPATIENT)
Dept: LAB | Facility: HOSPITAL | Age: 57
End: 2022-01-31
Payer: MEDICARE

## 2022-01-31 DIAGNOSIS — E10.42 TYPE 1 DIABETES MELLITUS WITH POLYNEUROPATHY (HCC): ICD-10-CM

## 2022-01-31 DIAGNOSIS — I51.9 MYXEDEMA HEART DISEASE: ICD-10-CM

## 2022-01-31 DIAGNOSIS — E03.9 MYXEDEMA HEART DISEASE: ICD-10-CM

## 2022-01-31 LAB
EST. AVERAGE GLUCOSE BLD GHB EST-MCNC: 214 MG/DL
HBA1C MFR BLD: 9.1 %
T4 FREE SERPL-MCNC: 0.91 NG/DL (ref 0.76–1.46)
TSH SERPL DL<=0.05 MIU/L-ACNC: 10.7 UIU/ML (ref 0.36–3.74)

## 2022-01-31 PROCEDURE — 83036 HEMOGLOBIN GLYCOSYLATED A1C: CPT

## 2022-01-31 PROCEDURE — 84443 ASSAY THYROID STIM HORMONE: CPT

## 2022-01-31 PROCEDURE — 84439 ASSAY OF FREE THYROXINE: CPT

## 2022-01-31 PROCEDURE — 36415 COLL VENOUS BLD VENIPUNCTURE: CPT

## 2022-05-09 ENCOUNTER — APPOINTMENT (OUTPATIENT)
Dept: LAB | Facility: HOSPITAL | Age: 57
End: 2022-05-09
Payer: MEDICARE

## 2022-05-09 DIAGNOSIS — E10.69 TYPE I DIABETES MELLITUS WITH HYPEROSMOLAR COMA (HCC): ICD-10-CM

## 2022-05-09 DIAGNOSIS — E10.65 TYPE I DIABETES MELLITUS WITH HYPEROSMOLAR COMA (HCC): ICD-10-CM

## 2022-05-09 LAB
ALBUMIN SERPL BCP-MCNC: 3.4 G/DL (ref 3.5–5)
ALP SERPL-CCNC: 63 U/L (ref 46–116)
ALT SERPL W P-5'-P-CCNC: 21 U/L (ref 12–78)
AST SERPL W P-5'-P-CCNC: 14 U/L (ref 5–45)
BILIRUB DIRECT SERPL-MCNC: 0.07 MG/DL (ref 0–0.2)
BILIRUB SERPL-MCNC: 0.29 MG/DL (ref 0.2–1)
CHOLEST SERPL-MCNC: 209 MG/DL
EST. AVERAGE GLUCOSE BLD GHB EST-MCNC: 223 MG/DL
HBA1C MFR BLD: 9.4 %
HDLC SERPL-MCNC: 61 MG/DL
LDLC SERPL CALC-MCNC: 120 MG/DL (ref 0–100)
NONHDLC SERPL-MCNC: 148 MG/DL
PROT SERPL-MCNC: 6.8 G/DL (ref 6.4–8.2)
TRIGL SERPL-MCNC: 141 MG/DL

## 2022-05-09 PROCEDURE — 36415 COLL VENOUS BLD VENIPUNCTURE: CPT

## 2022-05-09 PROCEDURE — 80061 LIPID PANEL: CPT

## 2022-05-09 PROCEDURE — 80076 HEPATIC FUNCTION PANEL: CPT

## 2022-05-09 PROCEDURE — 83036 HEMOGLOBIN GLYCOSYLATED A1C: CPT

## 2022-08-01 ENCOUNTER — OFFICE VISIT (OUTPATIENT)
Dept: FAMILY MEDICINE CLINIC | Facility: CLINIC | Age: 57
End: 2022-08-01
Payer: MEDICARE

## 2022-08-01 VITALS
BODY MASS INDEX: 20.73 KG/M2 | WEIGHT: 140 LBS | SYSTOLIC BLOOD PRESSURE: 136 MMHG | HEIGHT: 69 IN | DIASTOLIC BLOOD PRESSURE: 80 MMHG

## 2022-08-01 DIAGNOSIS — E10.65 TYPE 1 DIABETES MELLITUS WITH HYPERGLYCEMIA (HCC): ICD-10-CM

## 2022-08-01 DIAGNOSIS — J45.909 ASTHMA, UNSPECIFIED ASTHMA SEVERITY, UNSPECIFIED WHETHER COMPLICATED, UNSPECIFIED WHETHER PERSISTENT: ICD-10-CM

## 2022-08-01 DIAGNOSIS — J44.9 CHRONIC OBSTRUCTIVE PULMONARY DISEASE, UNSPECIFIED COPD TYPE (HCC): ICD-10-CM

## 2022-08-01 DIAGNOSIS — E03.9 HYPOTHYROIDISM, UNSPECIFIED TYPE: ICD-10-CM

## 2022-08-01 DIAGNOSIS — F43.22 ACUTE ADJUSTMENT DISORDER WITH ANXIETY: ICD-10-CM

## 2022-08-01 DIAGNOSIS — E10.42 TYPE 1 DIABETES MELLITUS WITH DIABETIC POLYNEUROPATHY (HCC): ICD-10-CM

## 2022-08-01 DIAGNOSIS — M06.9 RHEUMATOID ARTHRITIS INVOLVING MULTIPLE SITES, UNSPECIFIED WHETHER RHEUMATOID FACTOR PRESENT (HCC): ICD-10-CM

## 2022-08-01 DIAGNOSIS — J30.1 ALLERGIC RHINITIS DUE TO POLLEN, UNSPECIFIED SEASONALITY: ICD-10-CM

## 2022-08-01 DIAGNOSIS — M19.90 ARTHRITIS: Primary | ICD-10-CM

## 2022-08-01 PROCEDURE — 99214 OFFICE O/P EST MOD 30 MIN: CPT | Performed by: FAMILY MEDICINE

## 2022-08-01 RX ORDER — ESCITALOPRAM OXALATE 10 MG/1
10 TABLET ORAL DAILY
Qty: 30 TABLET | Refills: 5 | Status: SHIPPED | OUTPATIENT
Start: 2022-08-01

## 2022-08-01 RX ORDER — ALBUTEROL SULFATE 2.5 MG/3ML
2.5 SOLUTION RESPIRATORY (INHALATION) 4 TIMES DAILY
Qty: 75 ML | Refills: 5 | Status: SHIPPED | OUTPATIENT
Start: 2022-08-01

## 2022-08-01 RX ORDER — GLUCAGON INJECTION, SOLUTION 1 MG/.2ML
1 INJECTION, SOLUTION SUBCUTANEOUS
COMMUNITY
Start: 2022-05-10

## 2022-08-01 NOTE — PROGRESS NOTES
Assessment/Plan:  Type 1 diabetes mellitus with diabetic polyneuropathy ([facility] )  Continued follow-up with [facility] Endocrinology  Lab Results   Component Value Date    HGBA1C 9 4 (H) 05/09/2022       Hypothyroidism  Continue follow-up with [facility] Endocrinology  Asthma  She continues with montelukast as well as albuterol by MDI or nebulizer  Her symptoms are relatively well controlled  She is on no inhaled corticosteroid by her choice  It has been recommended to her in the past     Allergic rhinitis  Continue with Singulair and Zyrtec  Rheumatoid arthritis ([facility] )  Patient has a long history of arthritis  Not entirely clear if this is an inflammatory arthritis  She does carry a diagnosis of seronegative rheumatoid arthritis per Dr Vimal Mazariegos many years ago  She has had no recent evaluation  We recommended that she contact Dr Ramsey Spencer for further evaluation  In the meantime we are going to get some basic rheumatologic testing  We will get a Lyme titer as well  Will follow up with her when results are available  She agrees with this plan  Acute adjustment disorder with anxiety  She continues with her long-time use of escitalopram which has been effective at treating her chronic anxiety  Attempts a colonoscopy have been unsuccessful in the past   She does not have a family history of colorectal cancer nor personal history of neoplastic disease  We are going to have a Cologuard sent to her  She also agrees to go for mammogram which we will order  She is also overdue for pneumococcal vaccination which we recommended to her and she will consider  Diagnoses and all orders for this visit:    Arthritis  -     Ambulatory Referral to Rheumatology; Future  -     CBC and differential; Future  -     Comprehensive metabolic panel; Future  -     C-reactive protein; Future  -     FRANKLIN Screen w/ Reflex to Titer/Pattern; Future  -     RF Screen w/ Reflex to Titer;  Future  -     Lyme Total Antibody Profile with reflex to WB; Future    Asthma, unspecified asthma severity, unspecified whether complicated, unspecified whether persistent  -     albuterol (2 5 mg/3 mL) 0 083 % nebulizer solution; Take 3 mL (2 5 mg total) by nebulization 4 (four) times a day    Acute adjustment disorder with anxiety  -     escitalopram (LEXAPRO) 10 mg tablet; Take 1 tablet (10 mg total) by mouth daily    Rheumatoid arthritis involving multiple sites, unspecified whether rheumatoid factor present (HCC)  -     CBC and differential; Future  -     Comprehensive metabolic panel; Future  -     C-reactive protein; Future  -     FRANKLIN Screen w/ Reflex to Titer/Pattern; Future  -     RF Screen w/ Reflex to Titer; Future    Chronic obstructive pulmonary disease, unspecified COPD type ([facility] )    Type 1 diabetes mellitus with hyperglycemia (HCC)    Type 1 diabetes mellitus with diabetic polyneuropathy (HCC)    Hypothyroidism, unspecified type    Allergic rhinitis due to pollen, unspecified seasonality    Other orders  -     Glucagon (Gvoke HypoPen 2-Pack) 1 MG/0 2ML SOAJ; Inject 1 mg under the skin          Subjective:   Chief Complaint   Patient presents with    Follow-up     Med check        Patient ID: Noel Leach is a 62 y o  female  HPI  The patient is a 19-year-old female who presents today for routine follow-up of multiple medical problems which include chronic asthma and allergies, arthritis felt to represent seronegative rheumatoid arthritis, fibromyalgia, chronic anxiety as well as being followed by endocrinology for type 1 diabetes, chronic lymphocytic thyroiditis as well as other  She states that she has been doing fair  She continues to have issues with her arthritis and fibromyalgia  She wonders if it can cause fevers  She had a fever up to 101 at 1 point  She has had some chronic aching  She took home COVID tests which have been negative  She has had tick bite or rash    She did note that when she rubs her left medial thigh near the medial femoral condyle she developed a hematoma which she has an image on her phone  She has not seen a rheumatologist for many years  She had seen in the past, Dr Zarina Vance   She continues on atorvastatin as well as insulin pump prescribed by Endocrinology as well as levothyroxine  She has ProAir and montelukast for her asthma as well as escitalopram for her chronic anxiety  She states that her asthma has been relatively quiescent  She uses her albuterol at bedtime in a closed [place] 1st thing in the morning  She has an unusual nocturnal awakening  She notes that her escitalopram remains effective for treatment of her chronic anxiety  She has no chest pain or shortness of breath  No cardiac irregularity  The following portions of the patient's history were reviewed and updated as appropriate: allergies, current medications, past family history, past medical history, past social history, past surgical history and problem list     ROS    Per the HPI  Objective:    Physical Exam  Vitals and nursing note reviewed  Constitutional:       Comments: Thin female in no acute distress   Cardiovascular:      Rate and Rhythm: Normal rate and regular rhythm  Pulmonary:      Effort: Pulmonary effort is normal       Breath sounds: Normal breath sounds  No wheezing, rhonchi or rales  Comments: She has good air movement with normal excursion present  Musculoskeletal:         General: Deformity present  Right lower leg: No edema  Left lower leg: No edema  Comments: She has some swan-neck deformity of her right little finger as well as some PIP greater than MCP and DIP enlargement  No present effusion   Lymphadenopathy:      Cervical: No cervical adenopathy  Skin:     Findings: No erythema or rash  Neurological:      Mental Status: She is alert and oriented to person, place, and time  Psychiatric:         Mood and Affect: Mood normal          Thought Content:  Thought content normal          Judgment: Judgment normal

## 2022-08-01 NOTE — ASSESSMENT & PLAN NOTE
Continue follow-up with [facility] Endocrinology 
Continue with Singulair and Zyrtec 
Continued follow-up with [facility] Endocrinology    Lab Results   Component Value Date    HGBA1C 9 4 (H) 05/09/2022
Patient has a long history of arthritis  Not entirely clear if this is an inflammatory arthritis  She does carry a diagnosis of seronegative rheumatoid arthritis per Dr Luther Durand many years ago  She has had no recent evaluation  We recommended that she contact Dr Erin Chan for further evaluation  In the meantime we are going to get some basic rheumatologic testing  We will get a Lyme titer as well  Will follow up with her when results are available  She agrees with this plan 
She continues with her long-time use of escitalopram which has been effective at treating her chronic anxiety 
She continues with montelukast as well as albuterol by MDI or nebulizer  Her symptoms are relatively well controlled  She is on no inhaled corticosteroid by her choice    It has been recommended to her in the past 
PMD

## 2022-10-12 PROBLEM — Z00.00 MEDICARE ANNUAL WELLNESS VISIT, SUBSEQUENT: Status: RESOLVED | Noted: 2020-07-06 | Resolved: 2022-10-12

## 2023-04-21 ENCOUNTER — OFFICE VISIT (OUTPATIENT)
Dept: FAMILY MEDICINE CLINIC | Facility: CLINIC | Age: 58
End: 2023-04-21

## 2023-04-21 VITALS
HEART RATE: 82 BPM | DIASTOLIC BLOOD PRESSURE: 72 MMHG | OXYGEN SATURATION: 98 % | SYSTOLIC BLOOD PRESSURE: 152 MMHG | WEIGHT: 137.2 LBS | BODY MASS INDEX: 20.32 KG/M2 | RESPIRATION RATE: 18 BRPM | TEMPERATURE: 99.1 F | HEIGHT: 69 IN

## 2023-04-21 DIAGNOSIS — S52.92XB RADIUS/ULNA FRACTURE, LEFT, OPEN TYPE I OR II, INITIAL ENCOUNTER: ICD-10-CM

## 2023-04-21 DIAGNOSIS — R01.1 HEART MURMUR: ICD-10-CM

## 2023-04-21 DIAGNOSIS — Z76.89 ENCOUNTER TO ESTABLISH CARE: ICD-10-CM

## 2023-04-21 DIAGNOSIS — E10.42 TYPE 1 DIABETES MELLITUS WITH DIABETIC POLYNEUROPATHY (HCC): ICD-10-CM

## 2023-04-21 DIAGNOSIS — M54.12 CERVICAL RADICULOPATHY: ICD-10-CM

## 2023-04-21 DIAGNOSIS — M06.9 RHEUMATOID ARTHRITIS, INVOLVING UNSPECIFIED SITE, UNSPECIFIED WHETHER RHEUMATOID FACTOR PRESENT (HCC): ICD-10-CM

## 2023-04-21 DIAGNOSIS — J44.9 CHRONIC OBSTRUCTIVE PULMONARY DISEASE, UNSPECIFIED COPD TYPE (HCC): ICD-10-CM

## 2023-04-21 DIAGNOSIS — R03.0 ELEVATED BLOOD PRESSURE READING: ICD-10-CM

## 2023-04-21 DIAGNOSIS — S52.202B RADIUS/ULNA FRACTURE, LEFT, OPEN TYPE I OR II, INITIAL ENCOUNTER: ICD-10-CM

## 2023-04-21 DIAGNOSIS — Z13.820 SCREENING FOR OSTEOPOROSIS: ICD-10-CM

## 2023-04-21 DIAGNOSIS — E55.9 VITAMIN D DEFICIENCY: ICD-10-CM

## 2023-04-21 DIAGNOSIS — Z78.0 POST-MENOPAUSAL: ICD-10-CM

## 2023-04-21 DIAGNOSIS — J45.909 ASTHMA, UNSPECIFIED ASTHMA SEVERITY, UNSPECIFIED WHETHER COMPLICATED, UNSPECIFIED WHETHER PERSISTENT: Primary | ICD-10-CM

## 2023-04-21 DIAGNOSIS — M54.16 LUMBAR RADICULITIS: ICD-10-CM

## 2023-04-21 RX ORDER — ALBUTEROL SULFATE 2.5 MG/3ML
2.5 SOLUTION RESPIRATORY (INHALATION) 4 TIMES DAILY
Qty: 75 ML | Refills: 5 | Status: SHIPPED | OUTPATIENT
Start: 2023-04-21

## 2023-04-21 NOTE — PROGRESS NOTES
Haile Lee 1965 female MRN: [id number]  [address]    Visit to Establish Care: Family Medicine    Assessment/Plan     No problem-specific Assessment & Plan notes found for this encounter  Mata King was seen today for establish care  Diagnoses and all orders for this visit:    Asthma, unspecified asthma severity, unspecified whether complicated, unspecified whether persistent  -     albuterol (2 5 mg/3 mL) 0 083 % nebulizer solution; Take 3 mL (2 5 mg total) by nebulization 4 (four) times a day  -     ProAir  (90 Base) MCG/ACT inhaler; Inhale 2 puffs every 4 (four) hours as needed for wheezing As directed  -     Complete PFT with post bronchodilator; Future    Chronic obstructive pulmonary disease, unspecified COPD type ([facility] )  -     Complete PFT with post bronchodilator; Future    Radius/ulna fracture, left, open type I or II, initial encounter  -     DXA bone density spine hip and pelvis; Future    Screening for osteoporosis  -     DXA bone density spine hip and pelvis; Future    Post-menopausal  -     DXA bone density spine hip and pelvis; Future    Type 1 diabetes mellitus with diabetic polyneuropathy (HCC)  -     Lipid Panel with Direct LDL reflex; Future    Cervical radiculopathy    Lumbar radiculitis    Rheumatoid arthritis, involving unspecified site, unspecified whether rheumatoid factor present (HCC)  -     RF Screen w/ Reflex to Titer; Future  -     Antinuclear Antibodies (FRANKLIN), IFA; Future  -     C-reactive protein; Future  -     CBC and differential; Future  -     Comprehensive metabolic panel; Future  -     Lyme Antibody Profile with reflex to WB; Future    Vitamin D deficiency  -     Vitamin D 25 hydroxy; Future    Heart murmur  -     Echo complete w/ contrast if indicated;  Future    Encounter to establish care        In addition to the above, the patient was counseled on general preventative health care subjects, including but not limited to:  - Nutrition, healthy weight, aerobic and weight-bearing exercise  - Mental health, social support, and self care  - Advised of the importance of dental hygiene and routine dental visits   - Patient made aware of  services at the office  Blood work, ECHO, PFT initial work-up  DEXA given post-menopausal and fracture  Recommended start monitoring blood pressure at home  SUBJECTIVE    HPI:  Jamir Woo is a 62 y o  female who presented to establish care with this family medicine practice  History of spinal stenosis, cervical and lumbar spine, prior surgery, deals with pain daily, pain level always over 6  Restless leg syndrome  Lexapro helps, depression, anxiety, panic attacks history  PHQ-2/9 Depression Screening    Little interest or pleasure in doing things: 0 - not at all  Feeling down, depressed, or hopeless: 1 - several days  PHQ-2 Score: 1  PHQ-2 Interpretation: Negative depression screen       Prior PCP told her she has rheumatoid arthritis, bilateral hand joint pain and deformity  Diagnosed with fibromyalgia as well  Type 1 diabetes- Diagnosed in 35s, went into DKA/hospitalized 3 times  On insulin pump  Follows with endocrinology Dr Marissa Vega  Asthma and allergies entire life, COPD  Tobacco use- Less than 1 pack per day, trying to cut back  Review of Systems   All other systems reviewed and are negative        Historical Information   Past Medical History:   Diagnosis Date   • Abscess of pubic region 3/9/2018   • Acute gouty arthritis 10/16/2015   • Asthma    • Diabetes mellitus ([address] [address] )    • Disease of thyroid gland    • Fibromyalgia syndrome    • High cholesterol    • MRSA infection 3/13/2018   • Neuropathic pain of both legs    • PONV (postoperative nausea and vomiting)    • Spinal stenosis      Past Surgical History:   Procedure Laterality Date   • BACK SURGERY      l5 - s1 fused   • BREAST BIOPSY Left     11yrs ago   benign   • CARPAL TUNNEL RELEASE Left    • HYSTERECTOMY     • ND NDSC WRST SURG W/RLS TRANSVRS CARPL LIGM Right 10/14/2016    Procedure: ENDOSCOPIC CARPAL TUNNEL RELEASE ;  Surgeon: Tarun Felix MD;  [facility];  Service: Orthopedics   • ND [address] WRST SURG W/RLS TRANSVRS CARPL LIGM Left 8/16/2016    Procedure: ENDOSCOPIC CARPAL TUNNEL RELEASE ;  Surgeon: Tarun Felix MD;  Location[facility];  Service: Orthopedics   • ND OPTX DSTL RADL I-ARTIC FX/EPIPHYSL SEP 2 FRAG Left 10/20/2020    Procedure: OPEN REDUCTION W/ INTERNAL FIXATION (ORIF) Left distal radius with osteotomy;  Surgeon: Ricardo Carranza MD;  Location: [facility];  Service: Orthopedics   • ND TENDON SHEATH INCISION Left 8/16/2016    Procedure: LONG TRIGGER FINGER RELEASE ;  Surgeon: Tarun Felix MD;  [facility];  Service: Orthopedics   • ND TENDON SHEATH INCISION Right 6/9/2017    Procedure: THUMB TRIGGER RELEASE;  Surgeon: Tarun Felix MD;  [facility];  Service: Orthopedics   • ULNAR NERVE TRANSPOSITION Right      Family History   Problem Relation Age of Onset   • Hyperlipidemia Mother    • Hypertension Mother    • Heart disease Mother    • Hyperlipidemia Father    • Hypertension Father    • Hyperlipidemia Brother    • Lumbar disc disease Brother    • No Known Problems Son    • No Known Problems Daughter    • No Known Problems Maternal Grandmother    • No Known Problems Maternal Grandfather    • No Known Problems Paternal Grandmother    • No Known Problems Paternal Grandfather    • No Known Problems Maternal Aunt    • No Known Problems Paternal Aunt      Social History     Socioeconomic History   • Marital status:      Spouse name: Not on file   • Number of children: Not on file   • Years of education: Not on file   • Highest education level: Not on file   Occupational History   • Not on file   Tobacco Use   • Smoking status: Every Day     Packs/day: 0 50     Types: Cigarettes   • Smokeless tobacco: Never   • Tobacco comments:     5 cigarettes/day   Vaping Use   • Vaping Use: Never used   Substance and Sexual Activity   • Alcohol use: Not Currently     Comment: holidays   • Drug use: No   • Sexual activity: Not on file   Other Topics Concern   • Not on file   Social History Narrative   • Not on file     Social Determinants of Health     Financial Resource Strain: Not on file   Food Insecurity: Not on file   Transportation Needs: Not on file   Physical Activity: Not on file   Stress: Not on file   Social Connections: Not on file   Intimate Partner Violence: Not on file   Housing Stability: Not on file     OB History        2    Para   2    Term   2            AB        Living           SAB        IAB        Ectopic        Multiple        Live Births                       Medications:      Current Outpatient Medications:   •  acetaminophen (TYLENOL) 500 mg tablet, Take 1 500mg tablet in the morning prior to surgery, then 1 tablet every 6 hours for 5-7 days  , Disp: 30 tablet, Rfl: 0  •  albuterol (2 5 mg/3 mL) 0 083 % nebulizer solution, Take 3 mL (2 5 mg total) by nebulization 4 (four) times a day, Disp: 75 mL, Rfl: 5  •  atorvastatin (LIPITOR) 80 mg tablet, Take 80 mg by mouth daily, Disp: , Rfl: 3  •  Blood Glucose Calibration (OT ULTRA/FASTTK CNTRL SOLN) SOLN, by In Vitro route, Disp: , Rfl:   •  cetirizine (ZyrTEC) 10 mg tablet, Take 10 mg by mouth daily  , Disp: , Rfl:   •  Continuous Blood Gluc Sensor (Dexcom G6 Sensor) MISC, Use 1 each daily, Disp: , Rfl:   •  escitalopram (LEXAPRO) 10 mg tablet, Take 1 tablet (10 mg total) by mouth daily, Disp: 30 tablet, Rfl: 5  •  Glucagon (Gvoke HypoPen 2-Pack) 1 MG/0 2ML SOAJ, Inject 1 mg under the skin, Disp: , Rfl:   •  glucose blood test strip, 4 strips daily, Disp: , Rfl:   •  HUMALOG 100 UNIT/ML injection, , Disp: , Rfl:   •  levothyroxine 112 mcg tablet, Take 112 mcg by mouth daily, Disp: , Rfl: 0  •  montelukast (SINGULAIR) 10 mg tablet, Take 1 tablet (10 mg total) by mouth daily, Disp: 30 tablet, Rfl: 5  •  ProAir  (90 Base) MCG/ACT inhaler, Inhale 2 puffs every 4 (four) hours as needed for wheezing As directed, Disp: 18 g, Rfl: 5  •  betamethasone dipropionate (DIPROSONE) 0 05 % ointment, Apply topically 2 (two) times a day (Patient not taking: Reported on 4/21/2023), Disp: 45 g, Rfl: 0  •  Respiratory Therapy Supplies (JET-AIR REUSABLE NEBULIZER SYS) KIT, by Does not apply route every 4 (four) hours as needed (wheezing), Disp: 1 each, Rfl: 1      Physical Exam:    Physical Exam  Vitals reviewed  Constitutional:       General: She is not in acute distress  Appearance: Normal appearance  She is not ill-appearing, toxic-appearing or diaphoretic  HENT:      Head: Normocephalic and atraumatic  Eyes:      General:         Right eye: No discharge  Left eye: No discharge  Extraocular Movements: Extraocular movements intact  Conjunctiva/sclera: Conjunctivae normal    Cardiovascular:      Rate and Rhythm: Normal rate and regular rhythm  Heart sounds: Murmur heard  No friction rub  No gallop  Pulmonary:      Effort: Pulmonary effort is normal  No respiratory distress  Breath sounds: Normal breath sounds  No stridor  No wheezing or rhonchi  Musculoskeletal:         General: No swelling, tenderness or signs of injury  Right lower leg: No edema  Left lower leg: No edema  Skin:     General: Skin is warm  Coloration: Skin is not pale  Findings: No erythema or rash  Neurological:      Mental Status: She is alert and oriented to person, place, and time  Motor: No weakness     Psychiatric:         Mood and Affect: Mood normal          Behavior: Behavior normal             Future Appointments   Date Time Provider Robbin Gutierrez   5/8/2023 11:00 AM DO CHOCO Reza [address] [facility]

## 2023-04-23 PROBLEM — R03.0 ELEVATED BLOOD PRESSURE READING: Status: ACTIVE | Noted: 2023-04-23

## 2023-04-23 PROBLEM — E55.9 VITAMIN D DEFICIENCY: Status: ACTIVE | Noted: 2023-04-23

## 2023-04-23 PROBLEM — R01.1 HEART MURMUR: Status: ACTIVE | Noted: 2023-04-23

## 2023-05-02 ENCOUNTER — HOSPITAL ENCOUNTER (OUTPATIENT)
Dept: BONE DENSITY | Facility: HOSPITAL | Age: 58
Discharge: HOME | End: 2023-05-02

## 2023-05-02 VITALS — HEIGHT: 69 IN | WEIGHT: 137.79 LBS | BODY MASS INDEX: 20.41 KG/M2

## 2023-05-02 DIAGNOSIS — S52.202B RADIUS/ULNA FRACTURE, LEFT, OPEN TYPE I OR II, INITIAL ENCOUNTER: ICD-10-CM

## 2023-05-02 DIAGNOSIS — S52.92XB RADIUS/ULNA FRACTURE, LEFT, OPEN TYPE I OR II, INITIAL ENCOUNTER: ICD-10-CM

## 2023-05-02 DIAGNOSIS — Z13.820 SCREENING FOR OSTEOPOROSIS: ICD-10-CM

## 2023-05-02 DIAGNOSIS — Z78.0 POST-MENOPAUSAL: ICD-10-CM

## 2023-05-03 ENCOUNTER — TELEPHONE (OUTPATIENT)
Dept: FAMILY MEDICINE CLINIC | Facility: CLINIC | Age: 58
End: 2023-05-03

## 2023-05-04 NOTE — TELEPHONE ENCOUNTER
I spoke with Pia Dia at radiology reading room  She said there was an error with DXA readings and over 10 got flagged and some offices got called before they were removed   I called patient and made her aware

## 2023-05-04 NOTE — TELEPHONE ENCOUNTER
DEXA scan looks normal can we ask radiology why this was flagged as significant finding and confirm if this is the correct patient?

## 2023-05-08 ENCOUNTER — OFFICE VISIT (OUTPATIENT)
Dept: FAMILY MEDICINE CLINIC | Facility: CLINIC | Age: 58
End: 2023-05-08

## 2023-05-08 VITALS
HEIGHT: 69 IN | SYSTOLIC BLOOD PRESSURE: 160 MMHG | HEART RATE: 86 BPM | WEIGHT: 135 LBS | DIASTOLIC BLOOD PRESSURE: 80 MMHG | BODY MASS INDEX: 19.99 KG/M2 | TEMPERATURE: 98.9 F | OXYGEN SATURATION: 98 %

## 2023-05-08 DIAGNOSIS — Z12.31 BREAST CANCER SCREENING BY MAMMOGRAM: ICD-10-CM

## 2023-05-08 DIAGNOSIS — Z72.0 TOBACCO USE: ICD-10-CM

## 2023-05-08 DIAGNOSIS — Z12.11 SCREENING FOR COLON CANCER: ICD-10-CM

## 2023-05-08 DIAGNOSIS — M25.50 POLYARTHRALGIA: Primary | ICD-10-CM

## 2023-05-08 DIAGNOSIS — I10 PRIMARY HYPERTENSION: ICD-10-CM

## 2023-05-08 DIAGNOSIS — E55.9 VITAMIN D DEFICIENCY: ICD-10-CM

## 2023-05-08 DIAGNOSIS — D72.829 LEUKOCYTOSIS, UNSPECIFIED TYPE: ICD-10-CM

## 2023-05-08 DIAGNOSIS — I73.00 RAYNAUD'S DISEASE WITHOUT GANGRENE: ICD-10-CM

## 2023-05-08 DIAGNOSIS — E10.42 TYPE 1 DIABETES MELLITUS WITH DIABETIC POLYNEUROPATHY (HCC): ICD-10-CM

## 2023-05-08 DIAGNOSIS — M79.7 FIBROMYALGIA: ICD-10-CM

## 2023-05-08 PROBLEM — R03.0 ELEVATED BLOOD PRESSURE READING: Status: RESOLVED | Noted: 2023-04-23 | Resolved: 2023-05-08

## 2023-05-08 RX ORDER — AMLODIPINE BESYLATE AND BENAZEPRIL HYDROCHLORIDE 2.5; 1 MG/1; MG/1
1 CAPSULE ORAL DAILY
Qty: 30 CAPSULE | Refills: 5 | Status: SHIPPED | OUTPATIENT
Start: 2023-05-08

## 2023-05-08 NOTE — PROGRESS NOTES
Assessment/Plan:       Problem List Items Addressed This Visit        Endocrine    Type 1 diabetes mellitus with diabetic polyneuropathy ([facility] )    Relevant Orders    IRIS Diabetic eye exam       Cardiovascular and Mediastinum    Primary hypertension    Relevant Medications    amLODIPine-benazepril (LOTREL 2 5-10) 2 5-10 MG per capsule    Other Relevant Orders    Basic metabolic panel    CBC and differential    Raynaud's disease without gangrene    Relevant Orders    Ambulatory Referral to Rheumatology       Other    Vitamin D deficiency    Fibromyalgia    Relevant Orders    Ambulatory Referral to Rheumatology    Polyarthralgia - Primary    Relevant Orders    Ambulatory Referral to Rheumatology   Other Visit Diagnoses     Tobacco use        Leukocytosis, unspecified type        Relevant Orders    Basic metabolic panel    CBC and differential    Screening for colon cancer        Relevant Orders    Cologuard    Breast cancer screening by mammogram        Relevant Orders    Mammo screening bilateral w 3d & cad            Referral to rheumatology to re-establish  Lifestyle modification discussed for hypertension, recommended we start medication as well amlodipine-benazepril 2 5-10 mg daily  Blood work 1-2 weeks after starting  Start vitamin D supplement  Subjective:      Patient ID: Ariel Acosta is a 62 y o  female  HPI     Rheumatoid factor and FRANKLIN negative, prior told history of rheumatoid arthritis  Hands are painful and joints swollen, DIP and PIP  Saw rheumatology in past and based on blood work diagnosed with fibromyalgia  Prior was on gabapentin, but was very drowsy with this  Now just deals with pain on day to day basis  Hypertension- /80 today, home BP monitoring 130-160/70-90  Drinks a lot of coffee, smokes cigarettes  Both parents on BP medication  Had ringing in ear this morning, lasted couple seconds, hears pulsing in ears sometimes  Gets sinus headaches but otherwise no headaches  "Otherwise feeling well  Leukocytosis- WBC 10 8  Discussed likely due to smoking  Vitamin D low 28  The following portions of the patient's history were reviewed and updated as appropriate: allergies, current medications, past family history, past medical history, past social history, past surgical history, and problem list     Review of Systems   All other systems reviewed and are negative  Objective:      /80   Pulse 86   Temp 98 9 °F (37 2 °C)   Ht 5' 9\" (1 753 m)   Wt 61 2 kg (135 lb)   SpO2 98%   BMI 19 94 kg/m²          Physical Exam  Vitals reviewed  Constitutional:       General: She is not in acute distress  Appearance: Normal appearance  She is not ill-appearing, toxic-appearing or diaphoretic  HENT:      Head: Normocephalic and atraumatic  Eyes:      General:         Right eye: No discharge  Left eye: No discharge  Extraocular Movements: Extraocular movements intact  Conjunctiva/sclera: Conjunctivae normal    Cardiovascular:      Rate and Rhythm: Normal rate and regular rhythm  Heart sounds: Murmur heard  No friction rub  No gallop  Pulmonary:      Effort: Pulmonary effort is normal  No respiratory distress  Breath sounds: Normal breath sounds  No stridor  No wheezing or rhonchi  Musculoskeletal:         General: No swelling, tenderness or signs of injury  Right lower leg: No edema  Left lower leg: No edema  Skin:     General: Skin is warm  Coloration: Skin is not pale  Findings: No erythema or rash  Neurological:      Mental Status: She is alert and oriented to person, place, and time  Motor: No weakness     Psychiatric:         Mood and Affect: Mood normal          Behavior: Behavior normal              DO Indy Joshi [address] [place] [address] Primary Care     "

## 2023-05-24 DIAGNOSIS — F43.22 ACUTE ADJUSTMENT DISORDER WITH ANXIETY: ICD-10-CM

## 2023-05-30 DIAGNOSIS — F43.22 ACUTE ADJUSTMENT DISORDER WITH ANXIETY: ICD-10-CM

## 2023-05-31 RX ORDER — ESCITALOPRAM OXALATE 10 MG/1
10 TABLET ORAL DAILY
Qty: 30 TABLET | Refills: 5 | Status: SHIPPED | OUTPATIENT
Start: 2023-05-31

## 2023-06-13 ENCOUNTER — TELEPHONE (OUTPATIENT)
Dept: FAMILY MEDICINE CLINIC | Facility: CLINIC | Age: 58
End: 2023-06-13

## 2023-06-30 ENCOUNTER — TELEPHONE (OUTPATIENT)
Dept: FAMILY MEDICINE CLINIC | Facility: CLINIC | Age: 58
End: 2023-06-30

## 2023-06-30 NOTE — TELEPHONE ENCOUNTER
Received Fax from [address] [place] [address]  Written Order form to be filled out, Scanned and put in PCP's folder up front

## 2023-07-03 ENCOUNTER — TELEPHONE (OUTPATIENT)
Dept: FAMILY MEDICINE CLINIC | Facility: CLINIC | Age: 58
End: 2023-07-03

## 2023-07-26 ENCOUNTER — OFFICE VISIT (OUTPATIENT)
Dept: FAMILY MEDICINE CLINIC | Facility: CLINIC | Age: 58
End: 2023-07-26
Payer: MEDICARE

## 2023-07-26 VITALS
DIASTOLIC BLOOD PRESSURE: 68 MMHG | SYSTOLIC BLOOD PRESSURE: 128 MMHG | OXYGEN SATURATION: 99 % | TEMPERATURE: 98.1 F | WEIGHT: 134 LBS | RESPIRATION RATE: 18 BRPM | BODY MASS INDEX: 19.85 KG/M2 | HEIGHT: 69 IN | HEART RATE: 89 BPM
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
## 2023-07-26 DIAGNOSIS — I10 PRIMARY HYPERTENSION: ICD-10-CM

## 2023-07-26 LAB — EXTERNAL EGFR: 101

## 2023-07-26 PROCEDURE — 99214 OFFICE O/P EST MOD 30 MIN: CPT | Performed by: FAMILY MEDICINE

## 2023-07-26 RX ORDER — AMOXICILLIN AND CLAVULANATE POTASSIUM 875; 125 MG/1; MG/1
1 TABLET, FILM COATED ORAL EVERY 12 HOURS SCHEDULED
Qty: 20 TABLET | Refills: 0 | Status: SHIPPED | OUTPATIENT
Start: 2023-07-26 | End: 2023-08-05

## 2023-07-26 NOTE — PROGRESS NOTES
Assessment/Plan:       Problem List Items Addressed This Visit        Cardiovascular and Mediastinum    Primary hypertension   Other Visit Diagnoses     Horse bite, initial encounter    -  Primary    Relevant Medications    amoxicillin-clavulanate (AUGMENTIN) 875-125 mg per tablet            Blood pressure significantly improved, continue current regimen. Will follow-up blood work results. Augmentin provided for pony bite finger. Subjective:      Patient ID: Abimael Wild is a 62 y.o. female. HPI     Hypertension- Currently taking amlodipine-benazepril 2.5-10 mg daily, tolerating well, whooshing sound in ears gone. Feeling well. Had blood work done this morning. She has a lot of her plate right now, caring for sick parents, taking care of farm by herself. She reports she was bit by a pony on her thumb feeding it. This morning. Tdap UTD. The following portions of the patient's history were reviewed and updated as appropriate: allergies, current medications, past family history, past medical history, past social history, past surgical history, and problem list.    Review of Systems   All other systems reviewed and are negative. Objective:      /68   Pulse 89   Temp 98.1 °F (36.7 °C) (Tympanic)   Resp 18   Ht 5' 9" (1.753 m)   Wt 60.8 kg (134 lb)   SpO2 99%   BMI 19.79 kg/m²          Physical Exam  Vitals reviewed. Constitutional:       General: She is not in acute distress. Appearance: Normal appearance. She is not ill-appearing, toxic-appearing or diaphoretic. Pulmonary:      Effort: Pulmonary effort is normal. No respiratory distress. Skin:     General: Skin is warm. Comments: Laceration left thumb, joint nonpainful, full ROM   Neurological:      Mental Status: She is alert and oriented to person, place, and time.    Psychiatric:         Mood and Affect: Mood normal.         Behavior: Behavior normal.             Francoise Corcoran, DO  [address] [facility]

## 2023-08-22 RX ORDER — ESCITALOPRAM OXALATE 10 MG/1
TABLET ORAL
Qty: 30 TABLET | Refills: 5 | OUTPATIENT
Start: 2023-08-22

## 2023-09-22 ENCOUNTER — TELEPHONE (OUTPATIENT)
Dept: FAMILY MEDICINE CLINIC | Facility: CLINIC | Age: 58
End: 2023-09-22

## 2023-12-05 ENCOUNTER — VBI (OUTPATIENT)
Dept: ADMINISTRATIVE | Facility: OTHER | Age: 58
End: 2023-12-05

## 2024-02-01 DIAGNOSIS — F43.22 ACUTE ADJUSTMENT DISORDER WITH ANXIETY: ICD-10-CM

## 2024-02-02 RX ORDER — ESCITALOPRAM OXALATE 10 MG/1
10 TABLET ORAL DAILY
Qty: 90 TABLET | Refills: 3 | Status: SHIPPED | OUTPATIENT
Start: 2024-02-02

## 2024-02-02 NOTE — TELEPHONE ENCOUNTER
Patient requesting refill(s) of: lexapro 10 mg daily    Last filled: 5/31/2023 #30 x 5  Last appt: 7/26/2023  Next appt: none  Pharmacy: [facility]

## 2024-03-19 ENCOUNTER — TELEPHONE (OUTPATIENT)
Dept: ADMINISTRATIVE | Facility: OTHER | Age: 59
End: 2024-03-19

## 2024-03-19 NOTE — TELEPHONE ENCOUNTER
Upon review of the In Basket request and the patient's chart, initial outreach has been made via telephone call to facility. Please see Contacts section for details.     Thank you  Lo Cardenas

## 2024-03-19 NOTE — LETTER
Diabetic Foot Exam Form    Date Requested: 24  Patient: Jennifer Sanchez  Patient : 1965   Referring Provider: Bea Luna DO    Diabetic Foot Exam Performed with shoes and socks removed        Yes         No     Date of Diabetic Foot Exam ______________________________  Risk Score ____________________________________________    Left Foot       Visual Inspection         Monofilament Testing Sensory Exam        Pedal Pulses         Additional Comments         Right Foot      Visual Inspection         Monofilament Testing Sensory Exam       Pedal Pulses         Additional Comments         Comments __________________________________________________________    Practice Providing Exam ______________________________________________    Exam Performed By (print name) _______________________________________      Provider Signature ___________________________________________________      These reports are needed for  compliance.    Please fax this completed form and a copy of the Diabetic Foot Exam report to our office located at [address] [place] [address] as soon as possible via Fax [phone] gregory Blanchard: Phone [phone]    We thank you for your assistance in treating our mutual patient.

## 2024-03-19 NOTE — TELEPHONE ENCOUNTER
----- Message from Radha Aguilar sent at 3/18/2024 11:57 AM EDT -----  Regarding: DM FOOT  03/18/24 11:57 AM    Hello, our patient Jennifer Sanchez has had Diabetic Foot Exam completed/performed. Please assist in updating the patient chart by making an External outreach to [facility] facility located in [facility]. The date of service is 12/2023.    Thank you,  Radha Aguilar  [facility]

## 2024-03-19 NOTE — LETTER
Diabetic Foot Exam Form    Date Requested: 24  Patient: Jennifer Sanchez  Patient : 1965   Referring Provider: Bea Luna DO    Diabetic Foot Exam Performed with shoes and socks removed        Yes         No     Date of Diabetic Foot Exam ______________________________  Risk Score ____________________________________________    Left Foot       Visual Inspection         Monofilament Testing Sensory Exam        Pedal Pulses         Additional Comments         Right Foot      Visual Inspection         Monofilament Testing Sensory Exam       Pedal Pulses         Additional Comments         Comments __________________________________________________________    Practice Providing Exam ______________________________________________    Exam Performed By (print name) _______________________________________      Provider Signature ___________________________________________________      These reports are needed for  compliance.    Please fax this completed form and a copy of the Diabetic Foot Exam report to our office located at [address] [place] [address] as soon as possible via Fax [phone] gregory Blanchard: Phone [phone]    We thank you for your assistance in treating our mutual patient.

## 2024-04-02 NOTE — TELEPHONE ENCOUNTER
As a follow-up, a second attempt has been made for outreach via fax to facility. Please see Contacts section for details.    Thank you  Lo Cardenas

## 2024-04-16 NOTE — TELEPHONE ENCOUNTER
As a final attempt, a third outreach has been made via fax to facility. Please see Contacts section for details. This encounter will be closed and completed by end of day. Should we receive the requested information because of previous outreach attempts, the requested patient's chart will be updated appropriately.     Thank you  Lo Cardenas

## 2024-05-23 ENCOUNTER — TELEPHONE (OUTPATIENT)
Dept: FAMILY MEDICINE CLINIC | Facility: CLINIC | Age: 59
End: 2024-05-23

## 2024-05-23 DIAGNOSIS — I10 PRIMARY HYPERTENSION: ICD-10-CM

## 2024-05-24 RX ORDER — AMLODIPINE BESYLATE AND BENAZEPRIL HYDROCHLORIDE 2.5; 1 MG/1; MG/1
1 CAPSULE ORAL DAILY
Qty: 30 CAPSULE | Refills: 3 | Status: SHIPPED | OUTPATIENT
Start: 2024-05-24

## 2025-04-15 DIAGNOSIS — I10 PRIMARY HYPERTENSION: ICD-10-CM

## 2025-04-17 RX ORDER — AMLODIPINE BESYLATE AND BENAZEPRIL HYDROCHLORIDE 2.5; 1 MG/1; MG/1
1 CAPSULE ORAL DAILY
Qty: 30 CAPSULE | Refills: 1 | Status: SHIPPED | OUTPATIENT
Start: 2025-04-17

## 2025-04-21 ENCOUNTER — TELEPHONE (OUTPATIENT)
Dept: FAMILY MEDICINE CLINIC | Facility: CLINIC | Age: 60
End: 2025-04-21

## 2025-05-12 ENCOUNTER — OFFICE VISIT (OUTPATIENT)
Dept: FAMILY MEDICINE CLINIC | Facility: CLINIC | Age: 60
End: 2025-05-12
Payer: MEDICARE

## 2025-05-12 VITALS
BODY MASS INDEX: 20.89 KG/M2 | OXYGEN SATURATION: 98 % | TEMPERATURE: 98.2 F | HEART RATE: 76 BPM | WEIGHT: 130 LBS | RESPIRATION RATE: 18 BRPM | HEIGHT: 66 IN | DIASTOLIC BLOOD PRESSURE: 76 MMHG | SYSTOLIC BLOOD PRESSURE: 144 MMHG

## 2025-05-12 DIAGNOSIS — J44.9 CHRONIC OBSTRUCTIVE PULMONARY DISEASE, UNSPECIFIED COPD TYPE (HCC): ICD-10-CM

## 2025-05-12 DIAGNOSIS — Z12.11 SCREENING FOR COLON CANCER: Primary | ICD-10-CM

## 2025-05-12 DIAGNOSIS — Z00.00 ENCOUNTER FOR ANNUAL WELLNESS VISIT (AWV) IN MEDICARE PATIENT: ICD-10-CM

## 2025-05-12 DIAGNOSIS — I10 PRIMARY HYPERTENSION: ICD-10-CM

## 2025-05-12 DIAGNOSIS — F43.22 ACUTE ADJUSTMENT DISORDER WITH ANXIETY: ICD-10-CM

## 2025-05-12 DIAGNOSIS — Z72.0 TOBACCO USE: ICD-10-CM

## 2025-05-12 DIAGNOSIS — J45.909 ASTHMA, UNSPECIFIED ASTHMA SEVERITY, UNSPECIFIED WHETHER COMPLICATED, UNSPECIFIED WHETHER PERSISTENT: ICD-10-CM

## 2025-05-12 DIAGNOSIS — Z76.89 ENCOUNTER TO ESTABLISH CARE WITH NEW DOCTOR: ICD-10-CM

## 2025-05-12 DIAGNOSIS — Z12.31 ENCOUNTER FOR SCREENING MAMMOGRAM FOR BREAST CANCER: ICD-10-CM

## 2025-05-12 DIAGNOSIS — E10.42 TYPE 1 DIABETES MELLITUS WITH DIABETIC POLYNEUROPATHY (HCC): ICD-10-CM

## 2025-05-12 DIAGNOSIS — M25.50 POLYARTHRALGIA: ICD-10-CM

## 2025-05-12 PROCEDURE — 99204 OFFICE O/P NEW MOD 45 MIN: CPT | Performed by: NURSE PRACTITIONER

## 2025-05-12 PROCEDURE — G0439 PPPS, SUBSEQ VISIT: HCPCS | Performed by: NURSE PRACTITIONER

## 2025-05-12 RX ORDER — EZETIMIBE 10 MG/1
10 TABLET ORAL DAILY
COMMUNITY

## 2025-05-12 RX ORDER — AMLODIPINE BESYLATE AND BENAZEPRIL HYDROCHLORIDE 2.5; 1 MG/1; MG/1
1 CAPSULE ORAL DAILY
Qty: 90 CAPSULE | Refills: 3 | Status: SHIPPED | OUTPATIENT
Start: 2025-05-12

## 2025-05-12 RX ORDER — ESCITALOPRAM OXALATE 10 MG/1
10 TABLET ORAL DAILY
Qty: 90 TABLET | Refills: 3 | Status: SHIPPED | OUTPATIENT
Start: 2025-05-12

## 2025-05-12 RX ORDER — INSULIN LISPRO 100 [IU]/ML
INJECTION, SOLUTION INTRAVENOUS; SUBCUTANEOUS
COMMUNITY
Start: 2025-05-05

## 2025-05-12 RX ORDER — ALBUTEROL SULFATE 90 UG/1
2 AEROSOL, METERED RESPIRATORY (INHALATION) EVERY 4 HOURS PRN
Qty: 18 G | Refills: 5 | Status: SHIPPED | OUTPATIENT
Start: 2025-05-12

## 2025-05-12 NOTE — ASSESSMENT & PLAN NOTE
Orders:    ProAir  (90 Base) MCG/ACT inhaler; Inhale 2 puffs every 4 (four) hours as needed for wheezing As directed

## 2025-05-12 NOTE — PROGRESS NOTES
Name: Jennifer Sanchez      : 1965      MRN: [id number]  Encounter Provider: ANDERSON Gill  Encounter Date: 2025   Encounter department: [facility]IMARY [facility]  :  Assessment & Plan  Type 1 diabetes mellitus with diabetic polyneuropathy (HCC)    Lab Results   Component Value Date    HGBA1C 10.4 (H) 2025   Following closely with [facility] endo q 3 months          Acute adjustment disorder with anxiety    Orders:    escitalopram (LEXAPRO) 10 mg tablet; Take 1 tablet (10 mg total) by mouth daily    Chronic obstructive pulmonary disease, unspecified COPD type (HCC)  Continue to decrease tobacco use and use prn inhaler        Asthma, unspecified asthma severity, unspecified whether complicated, unspecified whether persistent    Orders:    ProAir  (90 Base) MCG/ACT inhaler; Inhale 2 puffs every 4 (four) hours as needed for wheezing As directed    Primary hypertension    Orders:    amLODIPine-benazepril (LOTREL 2.5-10) 2.5-10 MG per capsule; Take 1 capsule by mouth daily    Encounter for screening mammogram for breast cancer    Orders:    Mammo screening bilateral w 3d and cad; Future    Screening for colon cancer    Orders:    Cologuard    Encounter to establish care with new doctor         Tobacco use  Patient encouraged to continue to cut back       Polyarthralgia  Patient using otc tylenol and motrin very infrequently          Depression Screening and Follow-up Plan: Patient's depression screening was positive with a PHQ-2 score of 6. Their PHQ-9 score was 11.   Patient assessed for underlying major depression. Brief counseling provided and recommend additional follow-up/re-evaluation next office visit.     Tobacco Cessation Counseling: Tobacco cessation counseling was provided. The patient is sincerely urged to quit consumption of tobacco. She is not ready to quit tobacco. Medication options and side effects of medication not discussed. Patient refused medication.        Preventive health issues were discussed with patient, and age appropriate screening tests were ordered as noted in patient's After Visit Summary. Personalized health advice and appropriate referrals for health education or preventive services given if needed, as noted in patient's After Visit Summary.    History of Present Illness     Patient presents for a annual medicare wellness visit and to establish with new provider    She reports that she has had previous neck and spine surgery, and was not interested in seeing spine and pain     She states she ran out of her lexapro several weeks ago     She states she has been diabetic since her early 30's and is using a metronic pump   Is switching in between pumps at this time since her warranty was run out     As for her pain, she has tried gabapentin was on 1800 and did not help and made her feel poorly   And also cybalta and could not tolerate due to blood in her stools            Patient Care Team:  ANDERSON Galelgo as PCP - General (Internal Medicine)  MD Franny De MD Brian P George, MD John Berdini, MD Farooq Qureshi, MD    Review of Systems   Constitutional:  Negative for chills and fever.   HENT:  Negative for ear pain and sore throat.    Eyes:  Negative for pain and visual disturbance.   Respiratory:  Negative for cough and shortness of breath.    Cardiovascular:  Negative for chest pain and palpitations.   Gastrointestinal:  Negative for abdominal pain and vomiting.   Genitourinary:  Negative for dysuria and hematuria.   Musculoskeletal:  Positive for arthralgias, back pain and myalgias.   Skin:  Negative for color change and rash.   Neurological:  Negative for seizures and syncope.   Psychiatric/Behavioral:          Positive anxiety and depression    All other systems reviewed and are negative.    Medical History Reviewed by provider this encounter:  Tobacco  Allergies  Meds  Problems  Med Hx  Surg Hx  Fam Hx       Annual  Wellness Visit Questionnaire       Health Risk Assessment:   Patient rates overall health as good. Patient feels that their physical health rating is same. Patient is satisfied with their life. Eyesight was rated as same. Hearing was rated as same. Patient feels that their emotional and mental health rating is same. Patients states they are sometimes angry. Patient states they are never, rarely unusually tired/fatigued. Pain experienced in the last 7 days has been some. Patient's pain rating has been 3/10. Patient states that she has experienced no weight loss or gain in last 6 months.     Depression Screening:   PHQ-2 Score: 6  PHQ-9 Score: 11      Fall Risk Screening:   In the past year, patient has experienced: no history of falling in past year      Urinary Incontinence Screening:   Patient has not leaked urine accidently in the last six months.     Home Safety:  Patient does not have trouble with stairs inside or outside of their home. Patient has working smoke alarms Home safety hazards include: none.     Medications:   Patient is not currently taking any over-the-counter supplements. Patient is able to manage medications.     Activities of Daily Living (ADLs)/Instrumental Activities of Daily Living (IADLs):   Walk and transfer into and out of bed and chair?: Yes  Dress and groom yourself?: Yes    Bathe or shower yourself?: Yes    Feed yourself? Yes  Do your laundry/housekeeping?: Yes  Manage your money, pay your bills and track your expenses?: Yes  Make your own meals?: Yes    Do your own shopping?: Yes    Previous Hospitalizations:   Any hospitalizations or ED visits within the last 12 months?: No      Advance Care Planning:   Living will: Yes    Advanced directive: Yes      Preventive Screenings      Cardiovascular Screening:    General: Screening Current      Diabetes Screening:     General: Screening Not Indicated and History Diabetes      Lung Cancer Screening:     General: Screening Not Indicated       "Hepatitis C Screening:    General: Screening Current    Immunizations:  - Immunizations due: Prevnar 20 and Zoster (Shingrix)    Screening, Brief Intervention, and Referral to Treatment (SBIRT)     Screening  Typical number of drinks in a day: 0  Typical number of drinks in a week: 0  Interpretation: Low risk drinking behavior.       No results found.    Objective   /76   Pulse 76   Temp 98.2 °F (36.8 °C) (Temporal)   Resp 18   Ht 5' 6\" (1.676 m)   Wt 59 kg (130 lb)   SpO2 98%   BMI 20.98 kg/m²     Physical Exam  Vitals and nursing note reviewed.   Constitutional:       General: She is not in acute distress.     Appearance: She is well-developed.   HENT:      Head: Normocephalic and atraumatic.      Right Ear: Tympanic membrane, ear canal and external ear normal.      Left Ear: There is impacted cerumen.      Nose: Nose normal.      Mouth/Throat:      Mouth: Mucous membranes are moist.   Eyes:      Conjunctiva/sclera: Conjunctivae normal.   Cardiovascular:      Rate and Rhythm: Normal rate and regular rhythm.      Pulses: no weak pulses.           Dorsalis pedis pulses are 1+ on the right side and 1+ on the left side.        Posterior tibial pulses are 1+ on the right side and 1+ on the left side.      Heart sounds: No murmur heard.  Pulmonary:      Effort: Pulmonary effort is normal. No respiratory distress.      Breath sounds: Normal breath sounds.      Comments: Positive decreased throughout   Abdominal:      Palpations: Abdomen is soft.      Tenderness: There is no abdominal tenderness.   Musculoskeletal:         General: No swelling.      Cervical back: Neck supple.   Feet:      Right foot:      Skin integrity: No ulcer, skin breakdown, erythema, warmth, callus or dry skin.      Left foot:      Skin integrity: No ulcer, skin breakdown, erythema, warmth, callus or dry skin.   Skin:     General: Skin is warm and dry.      Capillary Refill: Capillary refill takes less than 2 seconds.   Neurological: "      Mental Status: She is alert and oriented to person, place, and time.   Psychiatric:         Mood and Affect: Mood normal.         Behavior: Behavior normal.         Thought Content: Thought content normal.         Judgment: Judgment normal.       Diabetic Foot Exam    Patient's shoes and socks removed.    Right Foot/Ankle   Right Foot Inspection  Skin Exam: skin normal and skin intact. No dry skin, no warmth, no callus, no erythema, no maceration, no abnormal color, no pre-ulcer, no ulcer and no callus.     Toe Exam: ROM and strength within normal limits.     Sensory   Vibration: intact  Monofilament testing: intact    Vascular  Capillary refills: < 3 seconds  The right DP pulse is 1+. The right PT pulse is 1+.     Left Foot/Ankle  Left Foot Inspection  Skin Exam: skin normal and skin intact. No dry skin, no warmth, no erythema, no maceration, normal color, no pre-ulcer, no ulcer and no callus.     Toe Exam: ROM and strength within normal limits.     Sensory   Vibration: intact  Monofilament testing: intact    Vascular  Capillary refills: < 3 seconds  The left DP pulse is 1+. The left PT pulse is 1+.     Assign Risk Category  No deformity present  No loss of protective sensation  No weak pulses  Risk: 0

## 2025-05-12 NOTE — ASSESSMENT & PLAN NOTE
Orders:    amLODIPine-benazepril (LOTREL 2.5-10) 2.5-10 MG per capsule; Take 1 capsule by mouth daily

## 2025-05-12 NOTE — ASSESSMENT & PLAN NOTE
Lab Results   Component Value Date    HGBA1C 10.4 (H) 03/14/2025   Following closely with [facility] endo q 3 months

## (undated) DEVICE — STOCKINETTE REGULAR

## (undated) DEVICE — DRAPE SHEET THREE QUARTER

## (undated) DEVICE — GLOVE SRG BIOGEL 8

## (undated) DEVICE — CUFF TOURNIQUET 18 X 4 IN QUICK CONNECT DISP 1 BLADDER

## (undated) DEVICE — INTENDED FOR TISSUE SEPARATION, AND OTHER PROCEDURES THAT REQUIRE A SHARP SURGICAL BLADE TO PUNCTURE OR CUT.: Brand: BARD-PARKER SAFETY BLADES SIZE 15, STERILE

## (undated) DEVICE — OCCLUSIVE GAUZE STRIP,3% BISMUTH TRIBROMOPHENATE IN PETROLATUM BLEND: Brand: XEROFORM

## (undated) DEVICE — INTENDED FOR TISSUE SEPARATION, AND OTHER PROCEDURES THAT REQUIRE A SHARP SURGICAL BLADE TO PUNCTURE OR CUT.: Brand: BARD-PARKER ® CARBON RIB-BACK BLADES

## (undated) DEVICE — ALL PURPOSE SPONGES,NON-WOVEN, 4 PLY: Brand: CURITY

## (undated) DEVICE — 2.8MM X 5" QUICK RELEASE DRILL: Brand: ACUMED

## (undated) DEVICE — 2.0MM QUICK RELEASE DRILL: Brand: ACUMED

## (undated) DEVICE — DRAPE KIT C-ARM W/PLATE PRTC FOOT SWITCH COVER

## (undated) DEVICE — SUT ETHIBOND 3-0 RB-1 30 IN MX552

## (undated) DEVICE — SUT ETHILON 4-0 PS-2 18 IN 1667H

## (undated) DEVICE — GAUZE SPONGES,16 PLY: Brand: CURITY

## (undated) DEVICE — GLOVE SRG BIOGEL ORTHOPEDIC 8

## (undated) DEVICE — LIGHT HANDLE COVER SLEEVE DISP BLUE STELLAR

## (undated) DEVICE — BANDAGE, ESMARK LF STR 6"X9' (20/CS): Brand: CYPRESS

## (undated) DEVICE — ACE WRAP 3 IN STERILE

## (undated) DEVICE — CHLORAPREP HI-LITE 26ML ORANGE

## (undated) DEVICE — SUT MONOCRYL 5-0 P-3 18 IN Y493G

## (undated) DEVICE — .054" X 6" GUIDE WIRE
Type: IMPLANTABLE DEVICE | Site: WRIST | Status: NON-FUNCTIONAL
Brand: ACUMED
Removed: 2020-10-20

## (undated) DEVICE — PAD CAST 3 IN COTTON NON STRL

## (undated) DEVICE — GLOVE SRG BIOGEL 7.5

## (undated) DEVICE — TUBING SUCTION 5MM X 12 FT

## (undated) DEVICE — GLOVE INDICATOR PI UNDERGLOVE SZ 8 BLUE

## (undated) DEVICE — SPONGE SCRUB 4 PCT CHLORHEXIDINE

## (undated) DEVICE — CURITY NON-ADHERENT STRIPS: Brand: CURITY

## (undated) DEVICE — SPLINT 4 X 15 IN FAST SET PLASTER

## (undated) DEVICE — C-WIRE PAK DOUBLE ENDED ORTHOPAEDIC WIRE, SPADE, .062" (1.57 MM)
Type: IMPLANTABLE DEVICE | Site: WRIST | Status: NON-FUNCTIONAL
Brand: C-WIRE
Removed: 2020-10-20

## (undated) DEVICE — PADDING CAST 4 IN  COTTON STRL

## (undated) DEVICE — LIGHT HANDLE COVER CAMERA DISP

## (undated) DEVICE — ACE WRAP 4 IN STERILE

## (undated) DEVICE — PLUMEPEN PRO 10FT

## (undated) DEVICE — STERILE BETHLEHEM PLASTIC HAND: Brand: CARDINAL HEALTH

## (undated) DEVICE — STERILE ALLENTOWN HAND PACK: Brand: CARDINAL HEALTH

## (undated) DEVICE — SPONGE PVP SCRUB WING STERILE